# Patient Record
Sex: MALE | Race: WHITE | Employment: FULL TIME | RURAL
[De-identification: names, ages, dates, MRNs, and addresses within clinical notes are randomized per-mention and may not be internally consistent; named-entity substitution may affect disease eponyms.]

---

## 2018-10-13 ENCOUNTER — HOSPITAL ENCOUNTER (EMERGENCY)
Facility: HOSPITAL | Age: 33
Discharge: HOME | End: 2018-10-13
Attending: EMERGENCY MEDICINE
Payer: COMMERCIAL

## 2018-10-13 ENCOUNTER — APPOINTMENT (OUTPATIENT)
Dept: GENERAL RADIOLOGY | Facility: HOSPITAL | Age: 33
End: 2018-10-13
Payer: COMMERCIAL

## 2018-10-13 VITALS
BODY MASS INDEX: 40.43 KG/M2 | SYSTOLIC BLOOD PRESSURE: 122 MMHG | HEIGHT: 74 IN | HEART RATE: 72 BPM | OXYGEN SATURATION: 98 % | WEIGHT: 315 LBS | RESPIRATION RATE: 18 BRPM | TEMPERATURE: 97.3 F | DIASTOLIC BLOOD PRESSURE: 74 MMHG

## 2018-10-13 DIAGNOSIS — S86.912A KNEE STRAIN, LEFT, INITIAL ENCOUNTER: Primary | ICD-10-CM

## 2018-10-13 PROCEDURE — 6370000000 HC RX 637 (ALT 250 FOR IP): Performed by: EMERGENCY MEDICINE

## 2018-10-13 PROCEDURE — 73562 X-RAY EXAM OF KNEE 3: CPT

## 2018-10-13 PROCEDURE — 99283 EMERGENCY DEPT VISIT LOW MDM: CPT

## 2018-10-13 RX ORDER — OXYCODONE HYDROCHLORIDE AND ACETAMINOPHEN 5; 325 MG/1; MG/1
2 TABLET ORAL ONCE
Status: COMPLETED | OUTPATIENT
Start: 2018-10-13 | End: 2018-10-13

## 2018-10-13 RX ORDER — OXYCODONE HYDROCHLORIDE AND ACETAMINOPHEN 5; 325 MG/1; MG/1
1 TABLET ORAL EVERY 6 HOURS PRN
Qty: 12 TABLET | Refills: 0 | Status: SHIPPED | OUTPATIENT
Start: 2018-10-13 | End: 2018-10-16

## 2018-10-13 RX ADMIN — OXYCODONE HYDROCHLORIDE AND ACETAMINOPHEN 2 TABLET: 5; 325 TABLET ORAL at 20:12

## 2018-10-13 ASSESSMENT — PAIN DESCRIPTION - LOCATION
LOCATION: KNEE
LOCATION: KNEE

## 2018-10-13 ASSESSMENT — PAIN DESCRIPTION - PAIN TYPE
TYPE: ACUTE PAIN
TYPE: ACUTE PAIN

## 2018-10-13 ASSESSMENT — ENCOUNTER SYMPTOMS
CHEST TIGHTNESS: 0
COUGH: 0
EYE PAIN: 0
WHEEZING: 0
SORE THROAT: 0
EYE DISCHARGE: 0
CONSTIPATION: 0
EYE REDNESS: 0
TROUBLE SWALLOWING: 0
SINUS PRESSURE: 0
RHINORRHEA: 0
VOMITING: 0
BACK PAIN: 0
SHORTNESS OF BREATH: 0
ABDOMINAL PAIN: 0
NAUSEA: 0
DIARRHEA: 0

## 2018-10-13 ASSESSMENT — PAIN DESCRIPTION - FREQUENCY
FREQUENCY: CONTINUOUS
FREQUENCY: CONTINUOUS

## 2018-10-13 ASSESSMENT — PAIN DESCRIPTION - ORIENTATION
ORIENTATION: LEFT
ORIENTATION: LEFT

## 2018-10-13 ASSESSMENT — PAIN SCALES - GENERAL
PAINLEVEL_OUTOF10: 9
PAINLEVEL_OUTOF10: 10

## 2018-10-13 ASSESSMENT — PAIN DESCRIPTION - DESCRIPTORS
DESCRIPTORS: ACHING
DESCRIPTORS: ACHING

## 2018-10-14 ENCOUNTER — APPOINTMENT (OUTPATIENT)
Dept: GENERAL RADIOLOGY | Facility: HOSPITAL | Age: 33
End: 2018-10-14

## 2018-10-14 ENCOUNTER — APPOINTMENT (OUTPATIENT)
Dept: MRI IMAGING | Facility: HOSPITAL | Age: 33
End: 2018-10-14

## 2018-10-14 ENCOUNTER — HOSPITAL ENCOUNTER (EMERGENCY)
Facility: HOSPITAL | Age: 33
Discharge: HOME | End: 2018-10-15
Attending: EMERGENCY MEDICINE | Admitting: EMERGENCY MEDICINE

## 2018-10-14 DIAGNOSIS — S83.512D RUPTURE OF ANTERIOR CRUCIATE LIGAMENT OF LEFT KNEE, SUBSEQUENT ENCOUNTER: ICD-10-CM

## 2018-10-14 DIAGNOSIS — S93.402D SPRAIN OF LEFT ANKLE, UNSPECIFIED LIGAMENT, SUBSEQUENT ENCOUNTER: ICD-10-CM
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
## 2018-10-14 DIAGNOSIS — M25.562 ACUTE PAIN OF LEFT KNEE: ICD-10-CM

## 2018-10-14 PROCEDURE — 73610 X-RAY EXAM OF ANKLE: CPT

## 2018-10-14 PROCEDURE — 99285 EMERGENCY DEPT VISIT HI MDM: CPT

## 2018-10-14 PROCEDURE — 73721 MRI JNT OF LWR EXTRE W/O DYE: CPT

## 2018-10-14 RX ORDER — OXYCODONE AND ACETAMINOPHEN 10; 325 MG/1; MG/1
1 TABLET ORAL ONCE
Status: COMPLETED | OUTPATIENT
Start: 2018-10-14 | End: 2018-10-14

## 2018-10-14 RX ADMIN — OXYCODONE HYDROCHLORIDE AND ACETAMINOPHEN 1 TABLET: 10; 325 TABLET ORAL at 20:57

## 2018-10-14 NOTE — ED PROVIDER NOTES
Subjective   Pérez Corado is a 33 y.o.male who presents to the ED with his wife with c/o left knee pain with onset yesterday that has worsened. He fell off a wagon and has his left leg caught has he fell backwards off the wagon. He experienced significant left knee pain but denies any other injuries or trauma to his head, neck, back, chest or abdomen. He went to [place] ED with negative xrays and d/c home on crutches and percocet with outpatient MRI with his PCP. Today, his left knee pain has significantly worsened and has worsening left leg edema. His knee pain worsens with movement and bearing weight and is not improved with using his crutches, applying ACE wrap or taking Percocet. He denies any numbness, weakness, fevers or any additional acute complaints at this time.       Large superior marie  Diffuse anterior knee tneedr  Pelvis femor no  tib norml  tneder anterior lateral malleous   No laxity   nv intact         History provided by:  Patient  Leg Pain   Location:  Knee  Time since incident:  1 day  Injury: yes    Mechanism of injury: fall    Fall:     Fall occurred: from wan.    Impact surface:  Unable to specify    Point of impact:  Unable to specify    Entrapped after fall: no    Knee location:  L knee  Pain details:     Quality:  Unable to specify    Radiates to:  Does not radiate    Severity:  Moderate    Onset quality:  Gradual    Duration:  1 day    Timing:  Constant    Progression:  Worsening  Chronicity:  New  Dislocation: no    Foreign body present:  No foreign bodies  Tetanus status:  Unknown  Prior injury to area:  No  Relieved by:  Nothing  Worsened by:  Bearing weight, extension, flexion and activity  Ineffective treatments:  Immobilization and compression (narcotics)  Associated symptoms: decreased ROM and swelling    Associated symptoms: no back pain, no fever, no neck pain and no numbness    Risk factors: obesity        Review of Systems   Constitutional: Negative for chills and fever.    HENT: Negative for congestion and rhinorrhea.    Respiratory: Negative for cough and shortness of breath.    Cardiovascular: Negative for chest pain.   Gastrointestinal: Negative for abdominal pain, diarrhea, nausea and vomiting.   Musculoskeletal: Positive for arthralgias and joint swelling. Negative for back pain and neck pain.   Neurological: Negative for weakness and numbness.   All other systems reviewed and are negative.      History reviewed. No pertinent past medical history.    No Known Allergies    History reviewed. No pertinent surgical history.    History reviewed. No pertinent family history.    Social History     Social History   • Marital status: Single     Social History Main Topics   • Alcohol use Yes   • Drug use: Unknown     Other Topics Concern   • Not on file         Objective   Physical Exam   Constitutional: He is oriented to person, place, and time. He appears well-developed and well-nourished. No distress.   HENT:   Head: Normocephalic and atraumatic.   Right Ear: External ear normal.   Left Ear: External ear normal.   Nose: Nose normal.   Eyes: Conjunctivae are normal. No scleral icterus.   Neck: Normal range of motion. Neck supple.   Cardiovascular: Normal rate, regular rhythm and normal heart sounds.  Exam reveals no friction rub.    No murmur heard.  Pulmonary/Chest: Effort normal and breath sounds normal. No respiratory distress. He has no wheezes. He has no rales.   Abdominal: Soft. Bowel sounds are normal. He exhibits no distension. There is no tenderness.   Musculoskeletal: He exhibits edema and tenderness.    large suprapatellar effusion and diffuse tenderness.  Ankle has mild tenderness anterior and posterior to the lateral malleolus without laxity ecchymosis erythema or warmth and minimal swelling only anterior and inferior to the lateral malleolus. The remainder of the tibia and fibula are unremarkable.  Humerus mid and proximal are nontender.  Pelvis is stable and nontender    Neurological: He is alert and oriented to person, place, and time.   Neurovascularly intact.   Skin: Skin is warm and dry. No rash noted. He is not diaphoretic. No erythema.   Psychiatric: He has a normal mood and affect. His behavior is normal.   Nursing note and vitals reviewed.      Procedures         ED Course  No results found for this or any previous visit (from the past 24 hour(s)).  Note: In addition to lab results from this visit, the labs listed above may include labs taken at another facility or during a different encounter within the last 24 hours. Please correlate lab times with ED admission and discharge times for further clarification of the services performed during this visit.    MRI Knee Left Without Contrast   Final Result   1.  There are no definite intact ACL fibers crossing the joint space. This is    most compatible with ACL rupture.   2. Small areas of contusion in the lateral tibial plateau and lateral femoral    condyle. No discrete fracture lines visualized.   3. Blunting of the free edge of the lateral meniscus, suspicious for small    radial tear.      THIS DOCUMENT HAS BEEN ELECTRONICALLY SIGNED BY TYLER STEWARD MD      XR Ankle 3+ View Left   Final Result     Soft tissue swelling/hematoma without obvious acute bony abnormality or    injury.  Recommend short interval followup if persistent/worsening symptoms.         THIS DOCUMENT HAS BEEN ELECTRONICALLY SIGNED BY JIM RASMUSSEN MD        Vitals:    10/14/18 2200 10/14/18 2230 10/14/18 2300 10/15/18 0100   BP: 155/81 143/95 149/92 137/91   Pulse:  68 71 72   Resp:  16 16 16   Temp:       SpO2: 94% 93% 95% 94%   Weight:       Height:         Medications   oxyCODONE-acetaminophen (PERCOCET)  MG per tablet 1 tablet (1 tablet Oral Given 10/14/18 2057)   oxyCODONE-acetaminophen (PERCOCET)  MG per tablet 1 tablet (1 tablet Oral Given 10/15/18 0227)   ibuprofen (ADVIL,MOTRIN) tablet 600 mg (600 mg Oral Given 10/15/18 0227)     ECG/EMG  Results (last 24 hours)     ** No results found for the last 24 hours. **          ED Course as of Oct 15 0301   Sun Oct 14, 2018   2025 I discussed findings with patient and reevaluated his knee.  He has a large suprapatellar effusion and diffuse tenderness.  Ankle has mild tenderness anterior and posterior to the lateral malleolus without laxity ecchymosis erythema or warmth and minimal swelling only anterior and inferior to the lateral malleolus.  Neurovascularly intact on reevaluation today.  The remainder of the tibia and fibula are unremarkable.  Humerus mid and proximal are nontender.  Pelvis is stable and nontender.I discussed findings with the patient.  He is absolutely in favor of proceeding with MRI in view of his persistent pain.  He reports that when he takes Percocet 10 he just goes to sleep.  I've ordered him a dose.  []   Mon Oct 15, 2018   0253 [place] query complete. Treatment plan to include limited course of prescribed  controlled substance. Risks including addiction, benefits, and alternatives presented to patient.     []   0255 She does serially reevaluated throughout the ED course.  He had a prolonged ED stay as he kept getting bumped from MRI due to more acute patients.  He was quite patient however throughout.  He is treated with analgesics.  MRI shows complete rupture of the ACL.I discussed the immobilizer NSAIDs, analgesics, no weightbearing, and follow-up.  Dr. pereyra is on-call for orthopedics and I've referred the patient to Dr. 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  []      ED Course User Index  [] Holland Olmos MD                     [facility]    Final diagnoses:   Fall, subsequent encounter   Acute pain of left knee   Sprain of left ankle, unspecified ligament,  subsequent encounter   Rupture of anterior cruciate ligament of left knee, subsequent encounter       Documentation assistance provided by mushtaq Ruiz.  Information recorded by the scribe was done at my direction and has been verified and validated by me.     Joe Ruiz  10/14/18 2127       Holland Olmos MD  10/15/18 3616

## 2018-10-14 NOTE — ED PROVIDER NOTES
[address] [place] [address] Court  eMERGENCY dEPARTMENT eNCOUnter      Pt Name: Patricia Becerra  MRN: [id number]  [place] 1985  Date of evaluation: 10/13/2018  Provider: Belkis Lang MD    CHIEF COMPLAINT       Chief Complaint   Patient presents with    Fall     pt fell off wagon and landed on wooden floor, landed on floor, getting left leg stuck in the railing of the wagon approximately 2 hours ago.  c/o left knee pain         HISTORY OF PRESENT ILLNESS   (Location/Symptom, Timing/Onset, Context/Setting, Quality, Duration, Modifying Factors, Severity)  Note limiting factors. Patricia Becerra is a 28 y.o. male who presents to the emergency department s/p fall off wagon hurting left knee 2 hours PTA. No other injury. Nursing Notes were reviewed. REVIEW OF SYSTEMS    (2-9 systems for level 4, 10 or more forlevel 5)     Review of Systems   Constitutional: Negative for chills and fever. HENT: Negative for congestion, ear pain, postnasal drip, rhinorrhea, sinus pressure, sneezing, sore throat and trouble swallowing. Eyes: Negative for pain, discharge and redness. Respiratory: Negative for cough, chest tightness, shortness of breath and wheezing. Cardiovascular: Negative for chest pain, palpitations and leg swelling. Gastrointestinal: Negative for abdominal pain, constipation, diarrhea, nausea and vomiting. Genitourinary: Negative for dysuria, flank pain, frequency, hematuria and urgency. Musculoskeletal: Negative for back pain, joint swelling and neck pain. Left knee pain   Skin: Negative for pallor and rash. Neurological: Negative for dizziness, syncope, weakness, numbness and headaches. Psychiatric/Behavioral: Negative for confusion and hallucinations. The patient is not nervous/anxious. PAST MEDICAL HISTORY     Past Medical History:   Diagnosis Date    Back pain     chronic    ETOH abuse          SURGICAL HISTORY     History reviewed.  No pertinent surgical history. CURRENT MEDICATIONS       Previous Medications    No medications on file       ALLERGIES     Patient has no known allergies. FAMILY HISTORY     History reviewed. No pertinent family history. SOCIAL HISTORY       Social History     Social History    Marital status: Single     Spouse name: N/A    Number of children: N/A    Years of education: N/A     Social History Main Topics    Smoking status: Never Smoker    Smokeless tobacco: Current User    Alcohol use 18.0 oz/week     30 Cans of beer per week    Drug use: No    Sexual activity: Not Asked     Other Topics Concern    None     Social History Narrative    None       SCREENINGS             PHYSICAL EXAM    (up to 7 for level 4, 8 or more for level 5)     ED Triage Vitals [10/13/18 1931]   BP Temp Temp Source Pulse Resp SpO2 Height Weight   124/77 97.3 °F (36.3 °C) Oral 73 18 96 % 6' 2\" (1.88 m) (!) 370 lb (167.8 kg)       Physical Exam   Constitutional: He is oriented to person, place, and time. He appears well-developed and well-nourished. HENT:   Head: Normocephalic and atraumatic. Eyes: Pupils are equal, round, and reactive to light. Conjunctivae and EOM are normal.   Neck: Neck supple. Cardiovascular: Normal rate and regular rhythm. Pulmonary/Chest: Effort normal and breath sounds normal.   Abdominal: Soft. Bowel sounds are normal.   Musculoskeletal: Normal range of motion. Left knee limited ROM sec to pain. NV status intact. No obvious deformity. Neurological: He is alert and oriented to person, place, and time. Skin: Skin is warm and dry. Psychiatric: He has a normal mood and affect.        DIAGNOSTIC RESULTS     EKG: All EKG's are interpreted by the Emergency Department Physician who either signs or Co-signs this chart in the absence of a cardiologist.        RADIOLOGY:   Non-plain film images such as CT, Ultrasound and MRI are read by the radiologist. Plain radiographic images are visualized andpreliminarily interpreted by the emergency physician with the below findings:    Left knee xray negative    Interpretationper the Radiologist below, if available at the time of this note:    XR KNEE LEFT (3 VIEWS)    (Results Pending)         ED BEDSIDE ULTRASOUND:   Performed by ED Physician - none    LABS:  Labs Reviewed - No data to display    All other labs were within normal range or not returned as of this dictation. EMERGENCY DEPARTMENT COURSE and DIFFERENTIAL DIAGNOSIS/MDM:   Vitals:    Vitals:    10/13/18 1931   BP: 124/77   Pulse: 73   Resp: 18   Temp: 97.3 °F (36.3 °C)   TempSrc: Oral   SpO2: 96%   Weight: (!) 370 lb (167.8 kg)   Height: 6' 2\" (1.88 m)           CRITICAL CARE TIME   Total Critical Care time was  minutes, excluding separatelyreportable procedures. There was a high probability ofclinically significant/life threatening deterioration in the patient's condition which required my urgent intervention. CONSULTS:  None    PROCEDURES:  None    PROGRESS NOTES:    Will apply knee immmobilizer, crutches and start on percocet. Follow up with PCP for ortho consult. FINAL IMPRESSION      1. Knee strain, left, initial encounter          Final diagnoses:   Knee strain, left, initial encounter       DISPOSITION/PLAN   DISPOSITION Decision To Discharge 10/13/2018 08:08:36 PM      PATIENT REFERRED TO:  [facility]  [facility] [address] [facility]  [phone]    If symptoms worsen      DISCHARGE MEDICATIONS:  New Prescriptions    OXYCODONE-ACETAMINOPHEN (PERCOCET) 5-325 MG PER TABLET    Take 1 tablet by mouth every 6 hours as needed for Pain for up to 3 days. Intended supply: 3 days. Take lowest dose possible to manage pain.          (Please note thatportions of this note may have been completed with a voice recognition program.  Efforts were [facility] edit the dictations but occasionally words are mis-transcribed.)    Marcelino Gomes MD (electronically

## 2018-10-15 VITALS
RESPIRATION RATE: 16 BRPM | DIASTOLIC BLOOD PRESSURE: 83 MMHG | WEIGHT: 315 LBS | TEMPERATURE: 97.9 F | BODY MASS INDEX: 40.43 KG/M2 | SYSTOLIC BLOOD PRESSURE: 140 MMHG | HEIGHT: 74 IN | HEART RATE: 64 BPM | OXYGEN SATURATION: 94 %

## 2018-10-15 PROBLEM — S83.512A LEFT ANTERIOR CRUCIATE LIGAMENT TEAR: Status: ACTIVE | Noted: 2018-10-15

## 2018-10-15 RX ORDER — IBUPROFEN 600 MG/1
600 TABLET ORAL ONCE
Status: COMPLETED | OUTPATIENT
Start: 2018-10-15 | End: 2018-10-15

## 2018-10-15 RX ORDER — OXYCODONE HYDROCHLORIDE AND ACETAMINOPHEN 5; 325 MG/1; MG/1
1-2 TABLET ORAL EVERY 6 HOURS PRN
Qty: 15 TABLET | Refills: 0 | Status: SHIPPED | OUTPATIENT
Start: 2018-10-15 | End: 2021-02-22

## 2018-10-15 RX ORDER — OXYCODONE AND ACETAMINOPHEN 10; 325 MG/1; MG/1
1 TABLET ORAL ONCE
Status: COMPLETED | OUTPATIENT
Start: 2018-10-15 | End: 2018-10-15

## 2018-10-15 RX ADMIN — IBUPROFEN 600 MG: 600 TABLET ORAL at 02:27

## 2018-10-15 RX ADMIN — OXYCODONE HYDROCHLORIDE AND ACETAMINOPHEN 1 TABLET: 10; 325 TABLET ORAL at 02:27

## 2018-10-15 NOTE — DISCHARGE INSTRUCTIONS
Rest and compression with no weightbearing on the left leg.  Crutches at all times while you're up.    Use the knee immobilizer as this will help with immobility and discomfort    Continue your pain medications as needed with ibuprofen as your primary pain medication in the Percocet as needed for additional pain    Follow-up with Dr Mcintosh, orthopedics for prompt reevaluation and definitive orthopedic care.  Call the office this morning to make a prompt appointment for care    Return to the ED if you have any acute, urgent, emergent, or progressive symptoms as discussed

## 2018-10-30 ENCOUNTER — HOSPITAL ENCOUNTER (OUTPATIENT)
Dept: PHYSICAL THERAPY | Facility: HOSPITAL | Age: 33
Setting detail: THERAPIES SERIES
Discharge: HOME | End: 2018-10-30
Payer: COMMERCIAL

## 2018-10-30 PROCEDURE — 97161 PT EVAL LOW COMPLEX 20 MIN: CPT

## 2018-10-30 ASSESSMENT — PAIN SCALES - GENERAL: PAINLEVEL_OUTOF10: 5

## 2018-10-30 ASSESSMENT — PAIN DESCRIPTION - PAIN TYPE: TYPE: ACUTE PAIN

## 2018-10-30 ASSESSMENT — PAIN DESCRIPTION - LOCATION: LOCATION: KNEE

## 2018-10-30 ASSESSMENT — PAIN DESCRIPTION - ORIENTATION: ORIENTATION: LEFT

## 2018-10-30 NOTE — PROGRESS NOTES
ambulate short community distances without need for an AD. [facility], PT, OCS        Certification of Medical Necessity: It will be understood that this treatment plan is certified medically necessary by the documenting therapist and referring physician mentioned in this report. Unless the physician indicated otherwise through written correspondence with our office, all further referrals will act as certification of medical necessity on this treatment plan. Thank you for this referral.  If you have questions regarding this plan of care, please call [phone].           Revisions to this plan (optional):                     Please sign and return this plan to:   FAX: [phone]      Signature:                                 Date:

## 2018-11-06 ENCOUNTER — HOSPITAL ENCOUNTER (OUTPATIENT)
Dept: PHYSICAL THERAPY | Facility: HOSPITAL | Age: 33
Setting detail: THERAPIES SERIES
Discharge: HOME | End: 2018-11-06
Payer: COMMERCIAL

## 2018-11-06 PROCEDURE — 97110 THERAPEUTIC EXERCISES: CPT

## 2018-11-06 PROCEDURE — 97016 VASOPNEUMATIC DEVICE THERAPY: CPT

## 2018-11-06 NOTE — FLOWSHEET NOTE
Physical Therapy Daily Treatment Note   Date:  2018    TIme In:     5666                 Time Out: 36    Patient Name:  Jesse Brown    :  1985  MRN: [id number]    Restrictions/Precautions:    Pertinent Medical History:  Medical/Treatment Diagnosis Information:  ·   L knee ACL tear  ·    Insurance/Certification information:    Mikayla Long   Physician Information:    Campbell Reis MD  Plan of care signed (Y/N):    Visit# / total visits:     2/    G-Code (if applicable):      Date / Visit # G-Code Applied:         Progress Note: []  Yes  [x]  No  Next due by: Visit #10      Pain level:  4 /10  Subjective: Pt reports his knee is feeling better today and he didn't need his crutches to walk today. He expressed that he went back to the doc yesterday and he seemed to be pleased with his progress so far. Objective:  Observation:   Test measurements:    Palpation:    Exercises:  Exercise Resistance/Repetitions Other comments   QS 2x10 6   Heelslides 2x10 6   SAQ 2x10 6   Hip abd/add 2x10 blue 6   Ankle DF/PF 2x10 blue 6   Nustep (pain free) L4x8' 6                                   Other Therapeutic Activities:      Manual Treatments:      Modalities:  Game ready x 15'. Timed Code Treatment Minutes:  35      Total Treatment Minutes:  38    Treatment/Activity Tolerance:  [x] Patient tolerated treatment well [] Patient limited by fatigue  [] Patient limited by pain  [] Patient limited by other medical complications  [x] Other: Pt completed tx with mod c/o pain and improvement since last visit.     Pain after treatment:      4/10    Prognosis: [x] Good [] Fair  [] Poor    Patient Requires Follow-up: [x] Yes  [] No    Plan:   [x] Continue per plan of care [] Alter current plan (see comments)  [] Plan of care initiated [] Hold pending MD visit [] Discharge    Plan for Next Session:        Electronically signed by:  Gordy Wallace PTA

## 2018-11-08 ENCOUNTER — HOSPITAL ENCOUNTER (OUTPATIENT)
Dept: PHYSICAL THERAPY | Facility: HOSPITAL | Age: 33
Setting detail: THERAPIES SERIES
Discharge: HOME | End: 2018-11-08
Payer: COMMERCIAL

## 2018-11-08 PROCEDURE — 97110 THERAPEUTIC EXERCISES: CPT

## 2018-11-08 PROCEDURE — 97530 THERAPEUTIC ACTIVITIES: CPT

## 2018-11-08 PROCEDURE — 97016 VASOPNEUMATIC DEVICE THERAPY: CPT

## 2018-11-13 ENCOUNTER — HOSPITAL ENCOUNTER (OUTPATIENT)
Dept: PHYSICAL THERAPY | Facility: HOSPITAL | Age: 33
Setting detail: THERAPIES SERIES
End: 2018-11-13
Payer: COMMERCIAL

## 2019-05-04 ENCOUNTER — HOSPITAL ENCOUNTER (OUTPATIENT)
Facility: HOSPITAL | Age: 34
Discharge: HOME | End: 2019-05-04
Payer: COMMERCIAL

## 2021-02-22 ENCOUNTER — OFFICE VISIT (OUTPATIENT)
Dept: NEUROLOGY | Facility: CLINIC | Age: 36
End: 2021-02-22

## 2021-02-22 VITALS
HEIGHT: 74 IN | HEART RATE: 79 BPM | BODY MASS INDEX: 40.43 KG/M2 | DIASTOLIC BLOOD PRESSURE: 80 MMHG | SYSTOLIC BLOOD PRESSURE: 122 MMHG | OXYGEN SATURATION: 94 % | TEMPERATURE: 98.4 F | WEIGHT: 315 LBS

## 2021-02-22 DIAGNOSIS — R20.0 NUMBNESS AND TINGLING OF BOTH FEET: Primary | ICD-10-CM

## 2021-02-22 DIAGNOSIS — I10 HYPERTENSION, UNSPECIFIED TYPE: ICD-10-CM

## 2021-02-22 DIAGNOSIS — G47.19 EXCESSIVE DAYTIME SLEEPINESS: ICD-10-CM

## 2021-02-22 DIAGNOSIS — M79.604 PAIN IN BOTH LOWER EXTREMITIES: ICD-10-CM

## 2021-02-22 DIAGNOSIS — M51.36 DDD (DEGENERATIVE DISC DISEASE), LUMBAR: ICD-10-CM

## 2021-02-22 DIAGNOSIS — M79.605 PAIN IN BOTH LOWER EXTREMITIES: ICD-10-CM

## 2021-02-22 DIAGNOSIS — F10.90 CHRONIC ALCOHOL USE: ICD-10-CM

## 2021-02-22 DIAGNOSIS — R06.83 SNORING: ICD-10-CM

## 2021-02-22 DIAGNOSIS — R20.2 NUMBNESS AND TINGLING OF BOTH FEET: Primary | ICD-10-CM

## 2021-02-22 PROCEDURE — 99204 OFFICE O/P NEW MOD 45 MIN: CPT | Performed by: NURSE PRACTITIONER

## 2021-02-22 RX ORDER — LISINOPRIL 20 MG/1
TABLET ORAL
COMMUNITY
Start: 2020-12-07 | End: 2021-11-04 | Stop reason: ALTCHOICE

## 2021-02-22 RX ORDER — GABAPENTIN 300 MG/1
300 CAPSULE ORAL 2 TIMES DAILY
Qty: 60 CAPSULE | Refills: 1 | Status: SHIPPED | OUTPATIENT
Start: 2021-02-22 | End: 2021-11-04 | Stop reason: ALTCHOICE

## 2021-02-22 NOTE — PATIENT INSTRUCTIONS
Peripheral Neuropathy  Peripheral neuropathy is a type of nerve damage. It affects nerves that carry signals between the spinal cord and the arms, legs, and the rest of the body (peripheral nerves). It does not affect nerves in the spinal cord or brain. In peripheral neuropathy, one nerve or a group of nerves may be damaged. Peripheral neuropathy is a broad category that includes many specific nerve disorders, like diabetic neuropathy, hereditary neuropathy, and carpal tunnel syndrome.  What are the causes?  This condition may be caused by:  · Diabetes. This is the most common cause of peripheral neuropathy.  · Nerve injury.  · Pressure or stress on a nerve that lasts a long time.  · Lack (deficiency) of B vitamins. This can result from alcoholism, poor diet, or a restricted diet.  · Infections.  · Autoimmune diseases, such as rheumatoid arthritis and systemic lupus erythematosus.  · Nerve diseases that are passed from parent to child (inherited).  · Some medicines, such as cancer medicines (chemotherapy).  · Poisonous (toxic) substances, such as lead and mercury.  · Too little blood flowing to the legs.  · Kidney disease.  · Thyroid disease.  In some cases, the cause of this condition is not known.  What are the signs or symptoms?  Symptoms of this condition depend on which of your nerves is damaged. Common symptoms include:  · Loss of feeling (numbness) in the feet, hands, or both.  · Tingling in the feet, hands, or both.  · Burning pain.  · Very sensitive skin.  · Weakness.  · Not being able to move a part of the body (paralysis).  · Muscle twitching.  · Clumsiness or poor coordination.  · Loss of balance.  · Not being able to control your bladder.  · Feeling dizzy.  · Sexual problems.  How is this diagnosed?  Diagnosing and finding the cause of peripheral neuropathy can be difficult. Your health care provider will take your medical history and do a physical exam. A neurological exam will also be done. This  involves checking things that are affected by your brain, spinal cord, and nerves (nervous system). For example, your health care provider will check your reflexes, how you move, and what you can feel.  You may have other tests, such as:  · Blood tests.  · Electromyogram (EMG) and nerve conduction tests. These tests check nerve function and how well the nerves are controlling the muscles.  · Imaging tests, such as CT scans or MRI to rule out other causes of your symptoms.  · Removing a small piece of nerve to be examined in a lab (nerve biopsy). This is rare.  · Removing and examining a small amount of the fluid that surrounds the brain and spinal cord (lumbar puncture). This is rare.  How is this treated?  Treatment for this condition may involve:  · Treating the underlying cause of the neuropathy, such as diabetes, kidney disease, or vitamin deficiencies.  · Stopping medicines that can cause neuropathy, such as chemotherapy.  · Medicine to relieve pain. Medicines may include:  ? Prescription or over-the-counter pain medicine.  ? Antiseizure medicine.  ? Antidepressants.  ? Pain-relieving patches that are applied to painful areas of skin.  · Surgery to relieve pressure on a nerve or to destroy a nerve that is causing pain.  · Physical therapy to help improve movement and balance.  · Devices to help you move around (assistive devices).  Follow these instructions at home:  Medicines  · Take over-the-counter and prescription medicines only as told by your health care provider. Do not take any other medicines without first asking your health care provider.  · Do not drive or use heavy machinery while taking prescription pain medicine.  Lifestyle    · Do not use any products that contain nicotine or tobacco, such as cigarettes and e-cigarettes. Smoking keeps blood from reaching damaged nerves. If you need help quitting, ask your health care provider.  · Avoid or limit alcohol. Too much alcohol can cause a vitamin B  deficiency, and vitamin B is needed for healthy nerves.  · Eat a healthy diet. This includes:  ? Eating foods that are high in fiber, such as fresh fruits and vegetables, whole grains, and beans.  ? Limiting foods that are high in fat and processed sugars, such as fried or sweet foods.  General instructions    · If you have diabetes, work closely with your health care provider to keep your blood sugar under control.  · If you have numbness in your feet:  ? Check every day for signs of injury or infection. Watch for redness, warmth, and swelling.  ? Wear padded socks and comfortable shoes. These help protect your feet.  · Develop a good support system. Living with peripheral neuropathy can be stressful. Consider talking with a mental health specialist or joining a support group.  · Use assistive devices and attend physical therapy as told by your health care provider. This may include using a walker or a cane.  · Keep all follow-up visits as told by your health care provider. This is important.  Contact a health care provider if:  · You have new signs or symptoms of peripheral neuropathy.  · You are struggling emotionally from dealing with peripheral neuropathy.  · Your pain is not well-controlled.  Get help right away if:  · You have an injury or infection that is not healing normally.  · You develop new weakness in an arm or leg.  · You fall frequently.  Summary  · Peripheral neuropathy is when the nerves in the arms, or legs are damaged, resulting in numbness, weakness, or pain.  · There are many causes of peripheral neuropathy, including diabetes, pinched nerves, vitamin deficiencies, autoimmune disease, and hereditary conditions.  · Diagnosing and finding the cause of peripheral neuropathy can be difficult. Your health care provider will take your medical history, do a physical exam, and do tests, including blood tests and nerve function tests.  · Treatment involves treating the underlying cause of the  neuropathy and taking medicines to help control pain. Physical therapy and assistive devices may also help.  This information is not intended to replace advice given to you by your health care provider. Make sure you discuss any questions you have with your health care provider.  Document Revised: 11/30/2018 Document Reviewed: 02/26/2018  Elsevier Patient Education © 2020 [email or web address]    Sleep Apnea  Sleep apnea affects breathing during sleep. It causes breathing to stop for a short time or to become shallow. It can also increase the risk of:  · Heart attack.  · Stroke.  · Being very overweight (obese).  · Diabetes.  · Heart failure.  · Irregular heartbeat.  The goal of treatment is to help you breathe normally again.  What are the causes?  There are three kinds of sleep apnea:  · Obstructive sleep apnea. This is caused by a blocked or collapsed airway.  · Central sleep apnea. This happens when the brain does not send the right signals to the muscles that control breathing.  · Mixed sleep apnea. This is a combination of obstructive and central sleep apnea.  The most common cause of this condition is a collapsed or blocked airway. This can happen if:  · Your throat muscles are too relaxed.  · Your tongue and tonsils are too large.  · You are overweight.  · Your airway is too small.  What increases the risk?  · Being overweight.  · Smoking.  · Having a small airway.  · Being older.  · Being male.  · Drinking alcohol.  · Taking medicines to calm yourself (sedatives or tranquilizers).  · Having family members with the condition.  What are the signs or symptoms?  · Trouble staying asleep.  · Being sleepy or tired during the day.  · Getting angry a lot.  · Loud snoring.  · Headaches in the morning.  · Not being able to focus your mind (concentrate).  · Forgetting things.  · Less interest in sex.  · Mood swings.  · Personality changes.  · Feelings of sadness (depression).  · Waking up a lot during the night to pee  (urinate).  · Dry mouth.  · Sore throat.  How is this diagnosed?  · Your medical history.  · A physical exam.  · A test that is done when you are sleeping (sleep study). The test is most often done in a sleep lab but may also be done at home.  How is this treated?    · Sleeping on your side.  · Using a medicine to get rid of mucus in your nose (decongestant).  · Avoiding the use of alcohol, medicines to help you relax, or certain pain medicines (narcotics).  · Losing weight, if needed.  · Changing your diet.  · Not smoking.  · Using a machine to open your airway while you sleep, such as:  ? An oral appliance. This is a mouthpiece that shifts your lower jaw forward.  ? A CPAP device. This device blows air through a mask when you breathe out (exhale).  ? An EPAP device. This has valves that you put in each nostril.  ? A BPAP device. This device blows air through a mask when you breathe in (inhale) and breathe out.  · Having surgery if other treatments do not work.  It is important to get treatment for sleep apnea. Without treatment, it can lead to:  · High blood pressure.  · Coronary artery disease.  · In men, not being able to have an erection (impotence).  · Reduced thinking ability.  Follow these instructions at home:  Lifestyle  · Make changes that your doctor recommends.  · Eat a healthy diet.  · Lose weight if needed.  · Avoid alcohol, medicines to help you relax, and some pain medicines.  · Do not use any products that contain nicotine or tobacco, such as cigarettes, e-cigarettes, and chewing tobacco. If you need help quitting, ask your doctor.  General instructions  · Take over-the-counter and prescription medicines only as told by your doctor.  · If you were given a machine to use while you sleep, use it only as told by your doctor.  · If you are having surgery, make sure to tell your doctor you have sleep apnea. You may need to bring your device with you.  · Keep all follow-up visits as told by your doctor.  This is important.  Contact a doctor if:  · The machine that you were given to use during sleep bothers you or does not seem to be working.  · You do not get better.  · You get worse.  Get help right away if:  · Your chest hurts.  · You have trouble breathing in enough air.  · You have an uncomfortable feeling in your back, arms, or stomach.  · You have trouble talking.  · One side of your body feels weak.  · A part of your face is hanging down.  These symptoms may be an emergency. Do not wait to see if the symptoms will go away. Get medical help right away. Call your local emergency services (911 in the [place] Do not drive yourself to the hospital.  Summary  · This condition affects breathing during sleep.  · The most common cause is a collapsed or blocked airway.  · The goal of treatment is to help you breathe normally while you sleep.  This information is not intended to replace advice given to you by your health care provider. Make sure you discuss any questions you have with your health care provider.  Document Revised: 10/04/2019 Document Reviewed: 08/13/2019  Elsevier Patient Education © 2020 [employer]vier Inc[employer]

## 2021-02-22 NOTE — PROGRESS NOTES
"     New Patient Office Visit      Patient Name: Pérez Corado  : 1985   MRN: [id number]     Referring Physician: No ref. provider found    Chief Complaint:    Chief Complaint   Patient presents with   • Consult     NP, In office to establish care for severe pain in both feet and legs.        History of Present Illness: Pérez Corado is a 36 y.o. male who is here today to establish care with Neurology.  He is accompanied by his partner today.  He has pain, tightness and tingling in his feet.  He says the symptoms began about 2 months ago.  He says he was tested for diabetes recently and was \"diagnosed as borderline\".  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  He has some burning to his upper legs at times.  He says he just wants his feet to stop hurting so he can work without problems.  He has low back pain and says he has been told he has \"disc problems\"-says his last imaging was 4-5 years ago.  He does have a history of a tractor accident with back injury some years ago.  He denies any history of cancer.  He drinks about 30 beers a week (he has a history of drinking much more but has cut back significantly).  Additional risk factors- BMI 55, patient stated lumbar disc herniation, HTN, chronic alcohol use.     Sleep questionnaire reviewed.  He snores and his partner says he stops breathing while he sleeps.  He sleeps about 3-4 hours per day.  He wakes up feeling tired.  He experiences restless sleep.  He moves his legs frequently during sleep.  He moves his legs frequently during the day also.  [place] sleepiness scale score 6.     Subjective      Review of Systems:   Review of Systems   Constitutional: Negative for fatigue, fever, unexpected weight gain and unexpected weight loss.   HENT: Negative for hearing loss, sore throat, swollen glands, tinnitus and trouble swallowing.    Eyes: Negative for blurred vision, double vision, " photophobia and visual disturbance.   Respiratory: Negative for cough, chest tightness and shortness of breath.    Cardiovascular: Negative for chest pain, palpitations and leg swelling.   Gastrointestinal: Negative for constipation, diarrhea and nausea.   Endocrine: Negative for cold intolerance and heat intolerance.   Musculoskeletal: Positive for back pain. Negative for gait problem, neck pain and neck stiffness.   Skin: Negative for color change and rash.   Allergic/Immunologic: Negative for environmental allergies and food allergies.   Neurological: Positive for numbness. Negative for dizziness, syncope, facial asymmetry, speech difficulty, weakness, headache, memory problem and confusion.   Psychiatric/Behavioral: Negative for agitation, behavioral problems and depressed mood. The patient is not nervous/anxious.        Past Medical History:   Past Medical History:   Diagnosis Date   • Migraine        Past Surgical History:   Past Surgical History:   Procedure Laterality Date   • EYE SURGERY         Family History:   Family History   Problem Relation Age of Onset   • Arthritis Father    • Diabetes Father    • Hypertension Father        Social History:   Social History     Socioeconomic History   • Marital status: Single     Spouse name: Not on file   • Number of children: Not on file   • Years of education: Not on file   • Highest education level: Not on file   Tobacco Use   • Smoking status: Never Smoker   • Smokeless tobacco: Current User   Substance and Sexual Activity   • Alcohol use: Yes     Comment: 30 pack a week    • Drug use: Never   • Sexual activity: Defer       Medications:     Current Outpatient Medications:   •  lisinopril (PRINIVIL,ZESTRIL) 20 MG tablet, , Disp: , Rfl:   •  gabapentin (Neurontin) 300 MG capsule, Take 1 capsule by mouth 2 (two) times a day., Disp: 60 capsule, Rfl: 1    Allergies:   No Known Allergies    Objective     Physical Exam:  Vital Signs:   Vitals:    02/22/21 0837   BP:  "122/80   BP Location: Right arm   Patient Position: Sitting   Cuff Size: Adult   Pulse: 79   Temp: 98.4 °F (36.9 °C)   SpO2: 94%   Weight: (!) 195 kg (429 lb 14.4 oz)   Height: 188 cm (74\")   PainSc: 10-Worst pain ever   PainLoc: Leg  Comment: legs, foot.     Body mass index is 55.2 kg/m².     Physical Exam  Vitals signs and nursing note reviewed.   Constitutional:       General: He is not in acute distress.     Appearance: He is well-developed. He is obese. He is not diaphoretic.   HENT:      Head: Normocephalic and atraumatic.   Eyes:      Conjunctiva/sclera: Conjunctivae normal.      Pupils: Pupils are equal, round, and reactive to light.      Comments: Redness to sclera   Neck:      Musculoskeletal: Normal range of motion and neck supple.   Cardiovascular:      Rate and Rhythm: Normal rate and regular rhythm.      Heart sounds: Normal heart sounds. No murmur. No friction rub. No gallop.    Pulmonary:      Effort: Pulmonary effort is normal. No respiratory distress.      Breath sounds: Normal breath sounds. No wheezing or rales.   Musculoskeletal: Normal range of motion.   Skin:     General: Skin is warm and dry.      Findings: No rash.   Neurological:      Mental Status: He is alert and oriented to person, place, and time.      Coordination: Finger-Nose-Finger Test normal.      Gait: Gait is intact. Tandem walk normal.   Psychiatric:         Speech: Speech normal.         Behavior: Behavior normal.         Thought Content: Thought content normal.         Neurologic Exam     Mental Status   Oriented to person, place, and time.   Attention: normal. Concentration: normal.   Speech: speech is normal   Level of consciousness: alert  Knowledge: good.     Cranial Nerves   Cranial nerves II through XII intact.     CN II   Visual fields full to confrontation.     CN III, IV, VI   Pupils are equal, round, and reactive to light.  Right pupil: Size: 2 mm. Shape: regular. Reactivity: sluggish.   Left pupil: Size: 2 mm. " Shape: regular. Reactivity: sluggish.   CN III: no CN III palsy  CN VI: no CN VI palsy  Nystagmus: none     CN V   Facial sensation intact.     CN VII   Facial expression full, symmetric.     CN VIII   CN VIII normal.     CN IX, X   CN IX normal.   CN X normal.     CN XI   CN XI normal.     CN XII   CN XII normal.     Motor Exam   Muscle bulk: normal  Overall muscle tone: normal    Strength   Right biceps: 5/5  Left biceps: 5/5  Right triceps: 5/5  Left triceps: 5/5  Right quadriceps: 5/5  Left quadriceps: 5/5  Right hamstrin/5  Left hamstrin/5    Sensory Exam   Right arm light touch: normal  Left arm light touch: normal  Right leg light touch: decreased from toes  Left leg light touch: decreased from toes  Proprioception normal.   Right leg pinprick: decreased from toes  Left leg pinprick: decreased from toes    Gait, Coordination, and Reflexes     Gait  Gait: normal    Coordination   Finger to nose coordination: normal  Tandem walking coordination: normal    Tremor   Resting tremor: absent  Intention tremor: absent  Action tremor: absent    Reflexes   Reflexes 2+ except as noted.       Assessment / Plan      Assessment/Plan:   Diagnoses and all orders for this visit:    1. Numbness and tingling of both feet (Primary)  -     Vitamin B12; Future  -     Methylmalonic Acid, Serum; Future; HgbA1c; Iron profile; Lipid profile  -     MRI Lumbar Spine Without Contrast; Future. Advised patient if insurance requires a lumbar spine x-ray, I will order that.   -     EMG & Nerve Conduction Test; Future  -     gabapentin (Neurontin) 300 MG capsule; Take 1 capsule by mouth 2 (two) times a day.  Dispense: 60 capsule; Refill: 1. Phil reviewed and CSA signed. I have advised patient to not take this medication with alcohol.   - Printed patient education on Peripheral neuropathy, Neuropathic pain, and RICCO provided today.   - I have advised patient to slowly cut back on his alcohol intake-I discussed his risk for alcoholic  encephalopathy.   - Advised patient to avoid driving if drowsy.     2. Pain in both lower extremities  -     Vitamin B12; Future  -     Methylmalonic Acid, Serum; Future  -     MRI Lumbar Spine Without Contrast; Future  -     EMG & Nerve Conduction Test; Future  -     gabapentin (Neurontin) 300 MG capsule; Take 1 capsule by mouth 2 (two) times a day.  Dispense: 60 capsule; Refill: 1    3. Snoring  -     Home Sleep Study; Future    4. Excessive daytime sleepiness  -     Home Sleep Study; Future    5. DDD (degenerative disc disease), lumbar  -     MRI Lumbar Spine Without Contrast; Future  -     EMG & Nerve Conduction Test; Future  -     gabapentin (Neurontin) 300 MG capsule; Take 1 capsule by mouth 2 (two) times a day.  Dispense: 60 capsule; Refill: 1    6. BMI 50.0-59.9, adult (CMS/HCC)  -     Home Sleep Study; Future  - Encouraged weight loss with a BMI goal of 24.     7. Hypertension, unspecified type  -     Home Sleep Study; Future- I have discussed the importance of treatment of RICCO in regards to cardiovascular health.        Follow Up:   Return in about 1 month (around 3/22/2021) for Recheck.    FRANCY Cuenca, FNP-C  [facility] Neu[facility]       Please note that portions of this note may have been completed with a voice recognition program. Efforts were made to edit the dictations, but occasionally words are mistranscribed.

## 2021-02-23 DIAGNOSIS — R20.2 NUMBNESS AND TINGLING OF BOTH FEET: ICD-10-CM

## 2021-02-23 DIAGNOSIS — M51.36 DDD (DEGENERATIVE DISC DISEASE), LUMBAR: ICD-10-CM

## 2021-02-23 DIAGNOSIS — M79.604 PAIN IN BOTH LOWER EXTREMITIES: Primary | ICD-10-CM

## 2021-02-23 DIAGNOSIS — M79.605 PAIN IN BOTH LOWER EXTREMITIES: Primary | ICD-10-CM

## 2021-02-23 DIAGNOSIS — R20.0 NUMBNESS AND TINGLING OF BOTH FEET: ICD-10-CM

## 2021-03-08 ENCOUNTER — HOSPITAL ENCOUNTER (OUTPATIENT)
Dept: SLEEP MEDICINE | Facility: HOSPITAL | Age: 36
End: 2021-03-08

## 2021-08-16 ENCOUNTER — APPOINTMENT (OUTPATIENT)
Dept: GENERAL RADIOLOGY | Facility: HOSPITAL | Age: 36
End: 2021-08-16
Payer: MEDICAID

## 2021-08-16 ENCOUNTER — HOSPITAL ENCOUNTER (EMERGENCY)
Facility: HOSPITAL | Age: 36
Discharge: HOME | End: 2021-08-16
Attending: EMERGENCY MEDICINE
Payer: MEDICAID

## 2021-08-16 VITALS
TEMPERATURE: 98 F | WEIGHT: 315 LBS | OXYGEN SATURATION: 96 % | HEART RATE: 75 BPM | RESPIRATION RATE: 18 BRPM | BODY MASS INDEX: 40.43 KG/M2 | DIASTOLIC BLOOD PRESSURE: 99 MMHG | SYSTOLIC BLOOD PRESSURE: 132 MMHG | HEIGHT: 74 IN

## 2021-08-16 DIAGNOSIS — S93.401A SPRAIN OF RIGHT ANKLE, UNSPECIFIED LIGAMENT, INITIAL ENCOUNTER: Primary | ICD-10-CM

## 2021-08-16 PROCEDURE — 6370000000 HC RX 637 (ALT 250 FOR IP): Performed by: EMERGENCY MEDICINE

## 2021-08-16 PROCEDURE — 73610 X-RAY EXAM OF ANKLE: CPT

## 2021-08-16 PROCEDURE — 99282 EMERGENCY DEPT VISIT SF MDM: CPT

## 2021-08-16 RX ORDER — HYDROCODONE BITARTRATE AND ACETAMINOPHEN 5; 325 MG/1; MG/1
2 TABLET ORAL ONCE
Status: COMPLETED | OUTPATIENT
Start: 2021-08-16 | End: 2021-08-16

## 2021-08-16 RX ADMIN — HYDROCODONE BITARTRATE AND ACETAMINOPHEN 2 TABLET: 5; 325 TABLET ORAL at 20:58

## 2021-08-16 ASSESSMENT — PAIN DESCRIPTION - PAIN TYPE: TYPE: ACUTE PAIN

## 2021-08-16 ASSESSMENT — PAIN DESCRIPTION - PROGRESSION: CLINICAL_PROGRESSION: GRADUALLY WORSENING

## 2021-08-16 ASSESSMENT — PAIN SCALES - GENERAL
PAINLEVEL_OUTOF10: 10
PAINLEVEL_OUTOF10: 10

## 2021-08-16 ASSESSMENT — PAIN DESCRIPTION - DESCRIPTORS: DESCRIPTORS: PATIENT UNABLE TO DESCRIBE

## 2021-08-16 ASSESSMENT — PAIN DESCRIPTION - ORIENTATION: ORIENTATION: RIGHT

## 2021-08-16 ASSESSMENT — PAIN DESCRIPTION - LOCATION: LOCATION: FOOT

## 2021-08-16 ASSESSMENT — PAIN DESCRIPTION - ONSET: ONSET: GRADUAL

## 2021-08-16 ASSESSMENT — PAIN DESCRIPTION - FREQUENCY: FREQUENCY: CONTINUOUS

## 2021-08-17 NOTE — ED NOTES
Pt will need taller crutches which are not in the supply room. report to bhavna. She will get his crutches.        Lilian Souza RN  08/16/21 24 [facility] Murrell Najjar, RN  08/16/21 [id number]

## 2021-08-17 NOTE — ED PROVIDER NOTES
[address] [place] [address] Court  eMERGENCY dEPARTMENT eNCOUnter      Pt Name: Jose Aragon  MRN: [id number]  [place]: 1985  Date ofevaluation: 8/01/0522  Provider: Samantha Franco MD    [address] [place] [address]       Chief Complaint   Patient presents with    Foot Pain         HISTORY OF PRESENT ILLNESS  (Location/Symptom, Timing/Onset, Context/Setting, Quality, Duration, Modifying Factors, Severity.)   Jose Aragon is a 39 y.o. male who presents to the emergency department with right ankle pain. Patient denies a specific injury. Patient states that he went fishing yesterday and stood for prolonged hours but denies any known injury. Patient states he just had pain after he took off his boot. Patient is requesting for a work note. Patient states he works at [employer] and has to stand 12 hours a day and he is unable to stand secondary to pain. Nursing notes were reviewed. REVIEW OF SYSTEMS    (2-9systems for level 4, 10 or more for level 5)   ROS:  General:  No fevers, no chills, no weakness  HEENT: No sore throat, runny nose or ear pain  Cardiovascular:  No chest pain, no palpitations  Respiratory:  No shortness of breath, no cough, no wheezing  Gastrointestinal:  No pain, no nausea, no vomiting, no diarrhea  Musculoskeletal: + left ankle pain. Skin:  No rash, no easy bruising  Genitourinary:  No dysuria, no hematuria    Except as noted above theremainder of the review of systems was reviewed and negative. PASTMEDICAL HISTORY     Past Medical History:   Diagnosis Date    Back pain     chronic    ETOH abuse          SURGICAL HISTORY     History reviewed. No pertinent surgical history. CURRENT MEDICATIONS       Previous Medications    No medications on file       ALLERGIES     Patient has no known allergies. FAMILY HISTORY     History reviewed. No pertinent family history.        SOCIAL HISTORY       Social History     Socioeconomic History    Marital status: Single Unable to bear weight; right ankle with tenderness, swelling and slight ecchymosis to medial malleolar area; there is an area of open skin just distal that appears to be cracking from dryness; ? Open source for early infection  Back: No midline or bony tenderness. Dermatology: Skin is warm and dry  Psych: Mentation is grossly normal, cognition is grossly normal. Affect is appropriate. DIAGNOSTIC RESULTS       RADIOLOGY:   Non-plain film images such as CT, Ultrasoundand MRI are read by the radiologist. Plain radiographic images are visualized and preliminarily interpreted by the emergency physician with the below findings:      [] Radiologist's Report Reviewed:  XR ANKLE RIGHT (MIN 3 VIEWS)   Final Result   Impression:   No acute osseous injury. ED BEDSIDE ULTRASOUND:   Performed by ED Physician - none    LABS:  Labs Reviewed - No data to display    I have reviewed and interpreted all of the currently available lab resultsfrom this visit (if applicable):  No results found for this visit on 08/16/21. All other labs were within normal range or not returned as of this dictation. EMERGENCY DEPARTMENT COURSE and DIFFERENTIAL DIAGNOSIS/MDM:   Vitals:    Vitals:    08/16/21 2059 08/16/21 2100 08/16/21 2130   BP:  129/82 115/80   Pulse: 82     Resp: 18     Temp: 98 °F (36.7 °C)     TempSrc: Oral     SpO2: 99% 95% 94%   Weight: (!) 375 lb (170.1 kg)     Height: 6' 2\" (1.88 m)               The patient will follow-up with their PCP in 1-2 days for reevaluation. If the patient or family members have any further concerns or any worsening symptoms they will return to the ED for reevaluation. CONSULTS:  None    PROCEDURES:  Procedures    CRITICAL CARE TIME    Total Critical Care time was 0 minutes, excluding separately reportable procedures.    There was a high probability of clinically significant/life threatening deterioration in the patient's condition which required my urgent

## 2021-08-17 NOTE — ED NOTES
Reviewed discharge plan with Jarred Santiago. Encouraged him to f/u with DAGMAR Peter and he understood. NAD noted on discharge. Ace wrap applied. Pt will be provided with crutches.       Electronically signed by Christian Guidry RN on 8/16/2021 at 10:01 PM       Christian Guidry RN  08/16/21 0937

## 2021-09-14 ENCOUNTER — APPOINTMENT (OUTPATIENT)
Dept: GENERAL RADIOLOGY | Facility: HOSPITAL | Age: 36
End: 2021-09-14

## 2021-09-14 ENCOUNTER — HOSPITAL ENCOUNTER (EMERGENCY)
Facility: HOSPITAL | Age: 36
Discharge: HOME | End: 2021-09-14
Attending: FAMILY MEDICINE | Admitting: FAMILY MEDICINE

## 2021-09-14 ENCOUNTER — APPOINTMENT (OUTPATIENT)
Dept: CT IMAGING | Facility: HOSPITAL | Age: 36
End: 2021-09-14

## 2021-09-14 VITALS
BODY MASS INDEX: 40.43 KG/M2 | SYSTOLIC BLOOD PRESSURE: 154 MMHG | HEART RATE: 91 BPM | DIASTOLIC BLOOD PRESSURE: 89 MMHG | WEIGHT: 315 LBS | TEMPERATURE: 98.7 F | RESPIRATION RATE: 18 BRPM | HEIGHT: 74 IN | OXYGEN SATURATION: 94 %

## 2021-09-14 DIAGNOSIS — R00.2 HEART PALPITATIONS: Primary | ICD-10-CM

## 2021-09-14 LAB
ALBUMIN SERPL-MCNC: 4 G/DL (ref 3.5–5.2)
ALBUMIN/GLOB SERPL: 1.1 G/DL
ALP SERPL-CCNC: 70 U/L (ref 39–117)
ALT SERPL W P-5'-P-CCNC: 64 U/L (ref 1–41)
AMPHET+METHAMPHET UR QL: NEGATIVE
AMPHETAMINES UR QL: NEGATIVE
ANION GAP SERPL CALCULATED.3IONS-SCNC: 13.5 MMOL/L (ref 5–15)
AST SERPL-CCNC: 36 U/L (ref 1–40)
BARBITURATES UR QL SCN: NEGATIVE
BASOPHILS # BLD AUTO: 0.06 10*3/MM3 (ref 0–0.2)
BASOPHILS NFR BLD AUTO: 0.7 % (ref 0–1.5)
BENZODIAZ UR QL SCN: NEGATIVE
BILIRUB SERPL-MCNC: 0.2 MG/DL (ref 0–1.2)
BILIRUB UR QL STRIP: NEGATIVE
BUN SERPL-MCNC: 10 MG/DL (ref 6–20)
BUN/CREAT SERPL: 10.8 (ref 7–25)
BUPRENORPHINE SERPL-MCNC: NEGATIVE NG/ML
CALCIUM SPEC-SCNC: 9.2 MG/DL (ref 8.6–10.5)
CANNABINOIDS SERPL QL: NEGATIVE
CHLORIDE SERPL-SCNC: 99 MMOL/L (ref 98–107)
CLARITY UR: CLEAR
CO2 SERPL-SCNC: 22.5 MMOL/L (ref 22–29)
COCAINE UR QL: NEGATIVE
COLOR UR: YELLOW
CREAT SERPL-MCNC: 0.93 MG/DL (ref 0.76–1.27)
DEPRECATED RDW RBC AUTO: 39.4 FL (ref 37–54)
EOSINOPHIL # BLD AUTO: 0.36 10*3/MM3 (ref 0–0.4)
EOSINOPHIL NFR BLD AUTO: 3.9 % (ref 0.3–6.2)
ERYTHROCYTE [DISTWIDTH] IN BLOOD BY AUTOMATED COUNT: 13.1 % (ref 12.3–15.4)
ETHANOL BLD-MCNC: 47 MG/DL (ref 0–10)
ETHANOL UR QL: 0.05 %
GFR SERPL CREATININE-BSD FRML MDRD: 91 ML/MIN/1.73
GLOBULIN UR ELPH-MCNC: 3.6 GM/DL
GLUCOSE SERPL-MCNC: 116 MG/DL (ref 65–99)
GLUCOSE UR STRIP-MCNC: NEGATIVE MG/DL
HCT VFR BLD AUTO: 47.1 % (ref 37.5–51)
HGB BLD-MCNC: 15.9 G/DL (ref 13–17.7)
HGB UR QL STRIP.AUTO: NEGATIVE
HOLD SPECIMEN: NORMAL
HOLD SPECIMEN: NORMAL
IMM GRANULOCYTES # BLD AUTO: 0.03 10*3/MM3 (ref 0–0.05)
IMM GRANULOCYTES NFR BLD AUTO: 0.3 % (ref 0–0.5)
KETONES UR QL STRIP: NEGATIVE
LEUKOCYTE ESTERASE UR QL STRIP.AUTO: NEGATIVE
LIPASE SERPL-CCNC: 30 U/L (ref 13–60)
LYMPHOCYTES # BLD AUTO: 2.66 10*3/MM3 (ref 0.7–3.1)
LYMPHOCYTES NFR BLD AUTO: 29.1 % (ref 19.6–45.3)
MAGNESIUM SERPL-MCNC: 2.2 MG/DL (ref 1.6–2.6)
MCH RBC QN AUTO: 27.9 PG (ref 26.6–33)
MCHC RBC AUTO-ENTMCNC: 33.8 G/DL (ref 31.5–35.7)
MCV RBC AUTO: 82.6 FL (ref 79–97)
METHADONE UR QL SCN: NEGATIVE
MONOCYTES # BLD AUTO: 0.68 10*3/MM3 (ref 0.1–0.9)
MONOCYTES NFR BLD AUTO: 7.4 % (ref 5–12)
NEUTROPHILS NFR BLD AUTO: 5.35 10*3/MM3 (ref 1.7–7)
NEUTROPHILS NFR BLD AUTO: 58.6 % (ref 42.7–76)
NITRITE UR QL STRIP: NEGATIVE
NRBC BLD AUTO-RTO: 0 /100 WBC (ref 0–0.2)
NT-PROBNP SERPL-MCNC: 14.7 PG/ML (ref 0–450)
OPIATES UR QL: NEGATIVE
OXYCODONE UR QL SCN: NEGATIVE
PCP UR QL SCN: NEGATIVE
PH UR STRIP.AUTO: <=5 [PH] (ref 5–8)
PLATELET # BLD AUTO: 302 10*3/MM3 (ref 140–450)
PMV BLD AUTO: 9.9 FL (ref 6–12)
POTASSIUM SERPL-SCNC: 4.3 MMOL/L (ref 3.5–5.2)
PROPOXYPH UR QL: NEGATIVE
PROT SERPL-MCNC: 7.6 G/DL (ref 6–8.5)
PROT UR QL STRIP: NEGATIVE
RBC # BLD AUTO: 5.7 10*6/MM3 (ref 4.14–5.8)
SARS-COV-2 RNA PNL SPEC NAA+PROBE: NOT DETECTED
SODIUM SERPL-SCNC: 135 MMOL/L (ref 136–145)
SP GR UR STRIP: 1.01 (ref 1–1.03)
TRICYCLICS UR QL SCN: NEGATIVE
TROPONIN T SERPL-MCNC: <0.01 NG/ML (ref 0–0.03)
TROPONIN T SERPL-MCNC: <0.01 NG/ML (ref 0–0.03)
UROBILINOGEN UR QL STRIP: NORMAL
WBC # BLD AUTO: 9.14 10*3/MM3 (ref 3.4–10.8)
WHOLE BLOOD HOLD SPECIMEN: NORMAL
WHOLE BLOOD HOLD SPECIMEN: NORMAL

## 2021-09-14 PROCEDURE — 96376 TX/PRO/DX INJ SAME DRUG ADON: CPT

## 2021-09-14 PROCEDURE — 87635 SARS-COV-2 COVID-19 AMP PRB: CPT | Performed by: FAMILY MEDICINE

## 2021-09-14 PROCEDURE — 85025 COMPLETE CBC W/AUTO DIFF WBC: CPT

## 2021-09-14 PROCEDURE — 83690 ASSAY OF LIPASE: CPT | Performed by: FAMILY MEDICINE

## 2021-09-14 PROCEDURE — 83880 ASSAY OF NATRIURETIC PEPTIDE: CPT | Performed by: FAMILY MEDICINE

## 2021-09-14 PROCEDURE — 96374 THER/PROPH/DIAG INJ IV PUSH: CPT

## 2021-09-14 PROCEDURE — 80053 COMPREHEN METABOLIC PANEL: CPT | Performed by: FAMILY MEDICINE

## 2021-09-14 PROCEDURE — 81003 URINALYSIS AUTO W/O SCOPE: CPT | Performed by: FAMILY MEDICINE

## 2021-09-14 PROCEDURE — 82077 ASSAY SPEC XCP UR&BREATH IA: CPT | Performed by: FAMILY MEDICINE

## 2021-09-14 PROCEDURE — 99283 EMERGENCY DEPT VISIT LOW MDM: CPT

## 2021-09-14 PROCEDURE — 93005 ELECTROCARDIOGRAM TRACING: CPT | Performed by: FAMILY MEDICINE

## 2021-09-14 PROCEDURE — 80306 DRUG TEST PRSMV INSTRMNT: CPT | Performed by: FAMILY MEDICINE

## 2021-09-14 PROCEDURE — 71045 X-RAY EXAM CHEST 1 VIEW: CPT

## 2021-09-14 PROCEDURE — 83735 ASSAY OF MAGNESIUM: CPT | Performed by: FAMILY MEDICINE

## 2021-09-14 PROCEDURE — 84484 ASSAY OF TROPONIN QUANT: CPT | Performed by: FAMILY MEDICINE

## 2021-09-14 RX ORDER — ASPIRIN 81 MG/1
81 TABLET ORAL DAILY
Qty: 30 TABLET | Refills: 0 | Status: SHIPPED | OUTPATIENT
Start: 2021-09-14 | End: 2021-11-04 | Stop reason: ALTCHOICE

## 2021-09-14 RX ORDER — SODIUM CHLORIDE 0.9 % (FLUSH) 0.9 %
10 SYRINGE (ML) INJECTION AS NEEDED
Status: DISCONTINUED | OUTPATIENT
Start: 2021-09-14 | End: 2021-09-14 | Stop reason: HOSPADM

## 2021-09-14 RX ORDER — DILTIAZEM HYDROCHLORIDE 5 MG/ML
5 INJECTION INTRAVENOUS ONCE
Status: COMPLETED | OUTPATIENT
Start: 2021-09-14 | End: 2021-09-14

## 2021-09-14 RX ORDER — DILTIAZEM HYDROCHLORIDE 5 MG/ML
10 INJECTION INTRAVENOUS ONCE
Status: COMPLETED | OUTPATIENT
Start: 2021-09-14 | End: 2021-09-14

## 2021-09-14 RX ORDER — IBUPROFEN 600 MG/1
600 TABLET ORAL EVERY 6 HOURS PRN
COMMUNITY
End: 2021-11-04 | Stop reason: ALTCHOICE

## 2021-09-14 RX ADMIN — DILTIAZEM HYDROCHLORIDE 10 MG: 5 INJECTION, SOLUTION INTRAVENOUS at 03:00

## 2021-09-14 RX ADMIN — DILTIAZEM HYDROCHLORIDE 5 MG: 5 INJECTION, SOLUTION INTRAVENOUS at 01:23

## 2021-09-14 NOTE — ED PROVIDER NOTES
Subjective   36-year-old male with past medical history of morbid obesity, hypertension presents emergency department via [facility] for palpitations..  Patient reports that over the last several weeks patient has had intermittent palpitations says he can feel when his heart gets out of rhythm does not last long but he can still tell that it is there he said he has had shortness of breath sore throat cough congestion and over the last couple days these palpitations have been coming more frequent so his fiancée called the ambulance [facility] to bring him here for evaluation.      History provided by:  Patient  Palpitations  Palpitations quality:  Regular  Onset quality:  Insidious  Timing:  Intermittent  Progression:  Waxing and waning  Chronicity:  New  Relieved by:  Nothing  Worsened by:  Nothing  Ineffective treatments:  None tried  Associated symptoms: nausea and shortness of breath    Associated symptoms: no chest pain        Review of Systems   Constitutional: Negative.  Negative for fever.   HENT: Negative.    Respiratory: Positive for shortness of breath.    Cardiovascular: Positive for palpitations. Negative for chest pain.   Gastrointestinal: Positive for nausea. Negative for abdominal pain.   Endocrine: Negative.    Genitourinary: Negative.  Negative for dysuria.   Skin: Negative.    Neurological: Negative.    Psychiatric/Behavioral: Negative.    All other systems reviewed and are negative.      Past Medical History:   Diagnosis Date   • Hypertension    • Migraine        No Known Allergies    Past Surgical History:   Procedure Laterality Date   • EYE SURGERY         Family History   Problem Relation Age of Onset   • Arthritis Father    • Diabetes Father    • Hypertension Father        Social History     Socioeconomic History   • Marital status: Single     Spouse name: Not on file   • Number of children: Not on file   • Years of education: Not on file   • Highest education level: Not on file   Tobacco  Use   • Smoking status: Never Smoker   • Smokeless tobacco: Current User   Substance and Sexual Activity   • Alcohol use: Yes     Comment: 30 pack a week    • Drug use: Never   • Sexual activity: Defer           Objective   Physical Exam  Vitals and nursing note reviewed.   Constitutional:       Appearance: Normal appearance. He is obese.   HENT:      Head: Normocephalic and atraumatic.      Right Ear: Tympanic membrane normal.      Left Ear: Tympanic membrane normal.      Nose: Nose normal.      Mouth/Throat:      Mouth: Mucous membranes are moist.   Eyes:      Extraocular Movements: Extraocular movements intact.      Pupils: Pupils are equal, round, and reactive to light.   Cardiovascular:      Rate and Rhythm: Regular rhythm. Tachycardia present.      Pulses: Normal pulses.   Pulmonary:      Effort: Pulmonary effort is normal.      Breath sounds: Normal breath sounds.   Abdominal:      General: Abdomen is flat.   Musculoskeletal:         General: Normal range of motion.   Skin:     General: Skin is warm.      Capillary Refill: Capillary refill takes less than 2 seconds.   Neurological:      General: No focal deficit present.      Mental Status: He is alert and oriented to person, place, and time.   Psychiatric:         Mood and Affect: Mood normal.         Behavior: Behavior normal.         Thought Content: Thought content normal.         Judgment: Judgment normal.         Procedures           ED Course  ED Course as of Sep 21 1907   Tue Sep 14, 2021   0054 EKG interpretation time is 0054 sinus tachycardia 102 bpm QRS duration 78 QT 3-4 QTC is 442 no evidence of acute ST elevation.    [MH]   0128 EKG interpretation time is 0057  bpm QRS duration 78 QT is 310 QTC is 396 no evidence of acute ST elevation prior to patient getting any intervention patient went back into a sinus rhythm.    [MH]   0344 CHADS2 Score for Atrial Fibrillation Stroke Risk - MDCalc  Calculated on Sep 14 2021 4:13 AM  1 points ->  Intermediate risk of thromboembolic event. 2.8% risk of event per year if no coumadin. The adjusted stroke rate was the expected stroke rate per 100 person-years derived from the multivariable model assuming that aspirin was not taken.    []   0345 Due to patient being tachycardic cardiac work-up is negative he is morbidly obese he has a history of hypertension I discussed with patient the need for CT PE protocol just to ensure that he does not have an acute blood clot in his lung causing his palpitations patient was initially agreeable however when he gets to CT he is change his mind he realizes that he could have a blood clot and verbalizes understanding but he says he cannot lie flat because he had a tractor accident when he was in high school and he even sleeps sitting up at home.    []      ED Course User Index  [] Georgette Villanueva DO                                           MDM  Number of Diagnoses or Management Options  Heart palpitations: new and requires workup     Amount and/or Complexity of Data Reviewed  Clinical lab tests: ordered and reviewed  Tests in the radiology section of CPT®: ordered and reviewed  Tests in the medicine section of CPT®: ordered and reviewed  Independent visualization of images, tracings, or specimens: yes    Risk of Complications, Morbidity, and/or Mortality  Presenting problems: high  Diagnostic procedures: high  Management options: high        Final diagnoses:   Heart palpitations       ED Disposition  ED Disposition     ED Disposition Condition Comment    Discharge Stable           Santos Churchill MD  [address] [address] [phone]               Medication List      New Prescriptions    aspirin 81 MG EC tablet  Take 1 tablet by mouth Daily.     metoprolol tartrate 25 MG tablet  Commonly known as: LOPRESSOR  Take 0.5 tablets by mouth 2 (Two) Times a Day.           Where to Get Your Medications      These medications were sent  to [id number] - [place], [place] - [address] [facility] [address] [facility] [address]  - [phone]  - [phone] FX  [address] [address]    Phone: [phone]   · aspirin 81 MG EC tablet  · metoprolol tartrate 25 MG tablet          Georgette Villanueva,   09/21/21 8429

## 2021-11-04 ENCOUNTER — OFFICE VISIT (OUTPATIENT)
Dept: CARDIOLOGY | Facility: CLINIC | Age: 36
End: 2021-11-04

## 2021-11-04 VITALS
SYSTOLIC BLOOD PRESSURE: 130 MMHG | DIASTOLIC BLOOD PRESSURE: 72 MMHG | HEART RATE: 92 BPM | BODY MASS INDEX: 40.43 KG/M2 | OXYGEN SATURATION: 99 % | WEIGHT: 315 LBS | RESPIRATION RATE: 20 BRPM | HEIGHT: 74 IN

## 2021-11-04 DIAGNOSIS — I10 PRIMARY HYPERTENSION: ICD-10-CM

## 2021-11-04 DIAGNOSIS — E66.01 MORBID OBESITY WITH BMI OF 50.0-59.9, ADULT (HCC): ICD-10-CM

## 2021-11-04 DIAGNOSIS — R00.2 PALPITATIONS: Primary | ICD-10-CM

## 2021-11-04 PROCEDURE — 99244 OFF/OP CNSLTJ NEW/EST MOD 40: CPT | Performed by: INTERNAL MEDICINE

## 2021-11-04 RX ORDER — METOPROLOL SUCCINATE 50 MG/1
50 TABLET, EXTENDED RELEASE ORAL DAILY
Qty: 90 TABLET | Refills: 3 | Status: SHIPPED | OUTPATIENT
Start: 2021-11-04 | End: 2022-11-15

## 2021-11-04 RX ORDER — IBUPROFEN 800 MG/1
TABLET ORAL
COMMUNITY
Start: 2021-08-20 | End: 2021-11-04 | Stop reason: ALTCHOICE

## 2021-11-04 NOTE — PROGRESS NOTES
"     [facility] Cardiology OP Consult Note    Pérez Corado  [id number]  2021    Referred By: No ref. provider found    Chief Complaint: Palpitations    History of Present Illness:   Mr. Pérez Corado is a 36 y.o. male who presents to the Cardiology Clinic for evaluation of palpitations. The patient has a past medical history significant for hypertension and morbid obesity with a BMI 57 kg/m². His past cardiac history is significant for palpitations. He reports his initial episode of palpitations occurred approximately 3 months ago. He was in his usual state of health, until he acutely developed palpitations described as a \"racing heart rate.\" He denies any associated dizziness or lightheadedness, however does report he felt fatigued and short of breath. The episode at that time resolved spontaneously. In , the patient developed recurrent palpitations again described as a fast heart rate. The episodes persisted, and he presented to the emergency department for evaluation. On review of records, the patient was apparently tachycardic with a heart rate in the 150s upon presentation. Apparently, an ECG was unable to be obtained prior to the patient converting to sinus rhythm. There is some mention of SVT, however it is unclear if the patient was truly in SVT versus atrial fibrillation. The only ECG available in the chart shows sinus tachycardia at 102 bpm. Since the episode in , he has not had any recurrent episodes. He is unable to identify any clear aggravating or triggering factors for his episodes of tachycardia. He denies exertional chest pain or chest discomfort. No other specific complaints today.    Past Cardiac Testin. Last Coronary Angio: None  2. Prior Stress Testing: None  3. Last Echo: None  4. Prior Holter Monitor: None    Review of Systems:   Review of Systems   Constitutional: Negative for activity change, appetite change, chills, diaphoresis, fatigue, fever, unexpected " "weight gain and unexpected weight loss.   Eyes: Negative for blurred vision and double vision.   Respiratory: Negative for cough, chest tightness, shortness of breath and wheezing.    Cardiovascular: Positive for palpitations. Negative for chest pain and leg swelling.   Gastrointestinal: Negative for abdominal pain, anal bleeding, blood in stool and GERD.   Endocrine: Negative for cold intolerance and heat intolerance.   Genitourinary: Negative for hematuria.   Neurological: Negative for dizziness, syncope, weakness and light-headedness.   Hematological: Does not bruise/bleed easily.   Psychiatric/Behavioral: Negative for depressed mood and stress. The patient is not nervous/anxious.        Past Medical History:   Past Medical History:   Diagnosis Date   • Hypertension    • Migraine        Past Surgical History:   Past Surgical History:   Procedure Laterality Date   • EYE SURGERY         Family History:   Family History   Problem Relation Age of Onset   • Arthritis Father    • Diabetes Father    • Hypertension Father        Social History:   Social History     Socioeconomic History   • Marital status: Single   Tobacco Use   • Smoking status: Never Smoker   • Smokeless tobacco: Current User   Substance and Sexual Activity   • Alcohol use: Yes     Comment: 30 pack a week    • Drug use: Never   • Sexual activity: Defer       Medications:     Current Outpatient Medications:   •  metoprolol succinate XL (TOPROL-XL) 50 MG 24 hr tablet, Take 1 tablet by mouth Daily., Disp: 90 tablet, Rfl: 3    Allergies:   No Known Allergies    Physical Exam:  Vital Signs:   Vitals:    11/04/21 1406 11/04/21 1410   BP: 128/72 130/72   BP Location: Right arm Left arm   Patient Position: Sitting Sitting   Pulse: 92    Resp: 20    SpO2: 99%    Weight: (!) 202 kg (445 lb)    Height: 188 cm (74\")        Physical Exam  Constitutional:       General: He is not in acute distress.     Appearance: He is well-developed. He is obese. He is not " diaphoretic.   HENT:      Head: Normocephalic and atraumatic.   Eyes:      General: No scleral icterus.     Pupils: Pupils are equal, round, and reactive to light.   Neck:      Trachea: No tracheal deviation.   Cardiovascular:      Rate and Rhythm: Normal rate and regular rhythm.      Heart sounds: Normal heart sounds. No murmur heard.  No friction rub. No gallop.       Comments: Normal JVD.    Pulmonary:      Effort: Pulmonary effort is normal. No respiratory distress.      Breath sounds: Normal breath sounds. No stridor. No wheezing or rales.   Chest:      Chest wall: No tenderness.   Abdominal:      General: Bowel sounds are normal. There is no distension.      Palpations: Abdomen is soft.      Tenderness: There is no abdominal tenderness. There is no guarding or rebound.      Comments: Obese abdomen   Musculoskeletal:         General: No swelling. Normal range of motion.      Cervical back: Neck supple. No tenderness.   Lymphadenopathy:      Cervical: No cervical adenopathy.   Skin:     General: Skin is warm and dry.      Findings: No erythema.   Neurological:      General: No focal deficit present.      Mental Status: He is alert and oriented to person, place, and time.   Psychiatric:         Mood and Affect: Mood normal.         Behavior: Behavior normal.         Results Review:   I reviewed the patient's new clinical results.    ECG 9/14/2021: Sinus tachycardia at 102 bpm. Normal axis. Occasional PACs.      Assessment / Plan:       1. Palpitations   --Presents today for evaluation of 2 episodes of palpitations related to tachycardia  --On review of ER records, patient was apparently tachycardic at 152 bpm on arrival  --As ECG was unable to be performed prior to the patient converting to sinus rhythm, therefore it is currently unclear if the patient was in SVT versus possible atrial fibrillation with RVR  --ECG available for review shows sinus tachycardia  --No recurrent episodes since 9/21  --Will obtain  baseline echocardiogram to rule out underlying structural valvular abnormalities  --30-day outpatient cardiac monitoring to attempt to further define tachyarrhythmia  --Follow-up in 3 months, sooner if required    2. Primary hypertension  --Continue metoprolol    3. Morbid obesity with BMI of 50.0-59.9  --Complicates all aspects of care        Follow Up:   Return in about 3 months (around 2/4/2022).      Thank you for allowing me to participate in the care of your patient. Please to not hesitate to contact me with additional questions or concerns.     JUAN R Churchill MD  Interventional Cardiology   11/04/2021  14:10 EDT

## 2021-11-09 ENCOUNTER — TELEPHONE (OUTPATIENT)
Dept: CARDIOLOGY | Facility: CLINIC | Age: 36
End: 2021-11-09

## 2021-12-07 ENCOUNTER — TELEPHONE (OUTPATIENT)
Dept: CARDIOLOGY | Facility: CLINIC | Age: 36
End: 2021-12-07

## 2021-12-07 NOTE — TELEPHONE ENCOUNTER
Patient started having chest pain/indigestion issues in the last week. He thinks this may be due to getting use to the medication or because he is not eating with the medication as it suggested. He states it feels like he is having food stuck in his chest/ GI area. Patient states when he took Lisinopril before seeing you that he never had these symptoms. I advised the patient to eat with the medication if see if this helps. He had ECHO today and MCOT placed today also.    Please review and advise.

## 2021-12-10 NOTE — TELEPHONE ENCOUNTER
Dr. Churchill request for the patient to follow up to evaluate his symptoms with Natalya next week.    Appointment made.

## 2021-12-14 ENCOUNTER — OFFICE VISIT (OUTPATIENT)
Dept: CARDIOLOGY | Facility: CLINIC | Age: 36
End: 2021-12-14

## 2021-12-14 VITALS
BODY MASS INDEX: 40.43 KG/M2 | SYSTOLIC BLOOD PRESSURE: 148 MMHG | RESPIRATION RATE: 22 BRPM | OXYGEN SATURATION: 95 % | HEART RATE: 86 BPM | WEIGHT: 315 LBS | DIASTOLIC BLOOD PRESSURE: 82 MMHG | HEIGHT: 74 IN

## 2021-12-14 DIAGNOSIS — R07.89 CHEST DISCOMFORT: Primary | ICD-10-CM

## 2021-12-14 DIAGNOSIS — Z91.89 AT RISK FOR OBSTRUCTIVE SLEEP APNEA: ICD-10-CM

## 2021-12-14 DIAGNOSIS — R06.83 SNORING: ICD-10-CM

## 2021-12-14 PROCEDURE — 99213 OFFICE O/P EST LOW 20 MIN: CPT | Performed by: NURSE PRACTITIONER

## 2021-12-14 NOTE — PROGRESS NOTES
"             [facility] Cardiology Office Follow Up Note    Pérez Corado  [id number]  12/14/2021    Primary Care Provider: Madelyn Kwong APRN   Referring Provider: No ref. provider found    Chief Complaint: Chest pain    History of Present Illness:   Mr. Pérez Corado is a 36 y.o. male who presents to the Cardiology Clinic for evaluation of chest pain.  The patient has a past medical history significant for hypertension and morbid obesity with a BMI 57 kg/m². His past cardiac history is significant for palpitations. He reports his initial episode of palpitations occurred approximately 3 months ago. He was in his usual state of health, until he acutely developed palpitations described as a \"racing heart rate.\" He denies any associated dizziness or lightheadedness, however does report he felt fatigued and short of breath. The episode at that time resolved spontaneously. In 9/21, the patient developed recurrent palpitations again described as a fast heart rate. The episodes persisted, and he presented to the emergency department for evaluation. On review of records, the patient was apparently tachycardic with a heart rate in the 150s upon presentation. Apparently, an ECG was unable to be obtained prior to the patient converting to sinus rhythm. There is some mention of SVT, however it is unclear if the patient was truly in SVT versus atrial fibrillation. The only ECG available in the chart shows sinus tachycardia at 102 bpm.  At his last cardiology clinic visit on 11/4/2021, the patient reported 2 episodes of palpitations related to tachycardia.  An echocardiogram was ordered to serve as a baseline assessment and rule out underlying structural/valvular abnormalities.  A 30-day outpatient cardiac monitor was also ordered to further define tachyarrhythmia.  Most recently however, the patient called last week with complaints of chest pain/indigestion.  At that time he reported to our medical " assistant that he felt like food was getting stuck in his chest/GI tract.  He presents today for follow-up.  Patient states that once he began taking his metoprolol with food, this lower chest/epigastric discomfort dissipated.  He reports that this discomfort occurred most often within a couple hours after taking his medication.  He is uncertain regarding the duration of the symptoms but usually less than an hour, he states.  He denies any radiation of discomfort.  He denies any associated symptoms of diaphoresis, nausea, vomiting.  He denies having experienced any discomfort in the past 5 to 7 days.  He denies ongoing palpitations.  His fiancée accompanies him today and reports that he snores loudly and stops breathing in his sleep.  The patient has never had a sleep evaluation.  He specifically denies any exertional angina or exertional dyspnea.  He specifically denies dizziness and syncope.  He does report some lower extremity edema with prolonged standing.  He is no other complaints or concerns at this time.    Past Cardiac Testin. Last Coronary Angio: None  2. Prior Stress Testing: None  3. Last Echo: 2021   PRELIMINARY RESULTS SHOW: Left ventricular ejection fraction appears to be 61 - 65%.   Left ventricular diastolic function was normal.   Left atrial volume is mildly increased.  4. Prior Holter Monitor: 30-day MCOT placed on ; results pending    Review of Systems:   Review of Systems   Constitutional: Negative for activity change, chills, diaphoresis, fatigue, fever and unexpected weight gain.   Eyes: Negative for blurred vision and visual disturbance.   Respiratory: Negative for apnea, cough, chest tightness, shortness of breath and wheezing.    Cardiovascular: Negative for chest pain, palpitations and leg swelling.   Gastrointestinal: Positive for indigestion. Negative for abdominal distention, blood in stool and GERD.   Endocrine: Negative for cold intolerance and heat intolerance.  "  Genitourinary: Negative for hematuria.   Musculoskeletal: Negative for gait problem, joint swelling and myalgias.   Skin: Negative for color change, pallor and bruise.   Neurological: Negative for dizziness, seizures, syncope, weakness, light-headedness, numbness, headache and confusion.   Hematological: Does not bruise/bleed easily.   Psychiatric/Behavioral: Negative for behavioral problems, sleep disturbance, suicidal ideas and depressed mood.     I have reviewed and confirmed the accuracy of the ROS as documented by the MA/LPN/RN FRANCY Quinteros    I have reviewed and/or updated the patient's past medical, past surgical, family, social history, problem list and allergies as appropriate.     Medications:     Current Outpatient Medications:   •  metoprolol succinate XL (TOPROL-XL) 50 MG 24 hr tablet, Take 1 tablet by mouth Daily., Disp: 90 tablet, Rfl: 3    Physical Exam:  Vital Signs:   Vitals:    12/14/21 1558   BP: 148/82   BP Location: Right arm   Patient Position: Sitting   Cuff Size: Large Adult   Pulse: 86   Resp: 22   SpO2: 95%   Weight: (!) 203 kg (447 lb)   Height: 188 cm (74.02\")     Body mass index is 57.37 kg/m².    Physical Exam  Vitals and nursing note reviewed.   Constitutional:       General: He is not in acute distress.     Appearance: He is well-developed. He is obese.   HENT:      Head: Normocephalic and atraumatic.   Eyes:      General: No scleral icterus.     Extraocular Movements: Extraocular movements intact.   Neck:      Trachea: Trachea normal.   Cardiovascular:      Rate and Rhythm: Normal rate and regular rhythm.      Pulses: Normal pulses.      Heart sounds: Normal heart sounds. No murmur heard.  No friction rub. No gallop.    Pulmonary:      Effort: Pulmonary effort is normal.      Breath sounds: Normal breath sounds.   Abdominal:      Palpations: Abdomen is soft.      Tenderness: There is no abdominal tenderness.   Musculoskeletal:         General: Normal range of motion. "      Cervical back: Neck supple.      Right lower leg: No edema.      Left lower leg: No edema.   Skin:     General: Skin is warm and dry.      Findings: No bruising, lesion or rash.   Neurological:      Mental Status: He is alert and oriented to person, place, and time.      Motor: No weakness.      Gait: Gait normal.   Psychiatric:         Mood and Affect: Mood normal.         Behavior: Behavior normal. Behavior is cooperative.         Thought Content: Thought content does not include suicidal ideation.         Results Review:   I reviewed the patient's new clinical results.      Assessment / Plan:   Diagnoses and all orders for this visit:    1. Chest/epigastric discomfort (Primary)  --Occurred after taking metoprolol on an empty stomach  --Currently without angina or exertional symptoms  --Improved after taking medication with food  --Denies recurrence in the past 5-7 days    2. At risk for obstructive sleep apnea  --Snoring  --Multiple apneic episodes as reported by his live-in fiancé  --BMI 57  --Patient is currently wearing an outpatient monitor for complaint of palpitations  --Refer to Dr. Mcnamara for sleep evaluation      Preventative Cardiology:   Tobacco Cessation: N/A   Advance Care Planning: ACP discussion was declined by the patient. Patient does not have an advance directive, declines further assistance.     Follow Up:   Follow-up with Dr. Churchill as scheduled    Thank you for allowing me to participate in the care of your patient. Please to not hesitate to contact me with additional questions or concerns.     FRANCY Newman

## 2021-12-17 PROBLEM — R07.89 CHEST DISCOMFORT: Status: ACTIVE | Noted: 2021-12-17

## 2022-01-11 ENCOUNTER — TELEPHONE (OUTPATIENT)
Dept: CARDIOLOGY | Facility: CLINIC | Age: 37
End: 2022-01-11

## 2022-01-11 NOTE — TELEPHONE ENCOUNTER
Melany states the monitor keeps going off and they called the company to have it changed to the [employer] monitor, this did not help it is constantly alarming and stating that it is out of the service area.    I looked up the reports to see if or what was received. Last transmission was 12/14/2021.     I have tried to call the patient back to have the patient go to a service area. I have not been successful.

## 2022-02-08 ENCOUNTER — HOSPITAL ENCOUNTER (OUTPATIENT)
Dept: PHYSICAL THERAPY | Facility: HOSPITAL | Age: 37
Setting detail: THERAPIES SERIES
Discharge: HOME | End: 2022-02-08
Payer: COMMERCIAL

## 2022-02-08 PROCEDURE — 97140 MANUAL THERAPY 1/> REGIONS: CPT

## 2022-02-08 PROCEDURE — 97161 PT EVAL LOW COMPLEX 20 MIN: CPT

## 2022-02-08 PROCEDURE — 97110 THERAPEUTIC EXERCISES: CPT

## 2022-02-08 ASSESSMENT — PAIN DESCRIPTION - DESCRIPTORS: DESCRIPTORS: ACHING;SORE;TIGHTNESS

## 2022-02-08 ASSESSMENT — PAIN DESCRIPTION - PAIN TYPE: TYPE: CHRONIC PAIN

## 2022-02-08 ASSESSMENT — PAIN DESCRIPTION - FREQUENCY: FREQUENCY: INTERMITTENT

## 2022-02-08 ASSESSMENT — PAIN DESCRIPTION - ORIENTATION: ORIENTATION: RIGHT

## 2022-02-08 ASSESSMENT — PAIN SCALES - GENERAL: PAINLEVEL_OUTOF10: 1

## 2022-02-08 ASSESSMENT — PAIN DESCRIPTION - LOCATION: LOCATION: ANKLE

## 2022-02-08 NOTE — PROGRESS NOTES
Physical Therapy  Initial Assessment  Date: 2022  Patient Name: Miguel Boo  MRN: [id number]  : 1985     Treatment Diagnosis: Right ankle pain; decreased ROM and strength; s/s consistent with posterior tibialis tendinitis    Restrictions  Restrictions/Precautions  Restrictions/Precautions: General Precautions  Required Braces or Orthoses?: No    Subjective   General  Chart Reviewed: Yes  Patient assessed for rehabilitation services?: Yes  Response To Previous Treatment: Not applicable  Family / Caregiver Present: No  Referring Practitioner: CRISTIAN  Diagnosis: Posterior tibialis tendinitis  Follows Commands: Within Functional Limits  PT Visit Information  PT Insurance Information: [employer]  Subjective  Subjective: Patient reports: he has been experiencing right ankle pain, medial aspect since this past August; denies injury or specific event, but states that one day after work and going fishing, he noticed bruising at the medial ankle area; had x-rays that wree negative for bony injury; treatment has been conservative with wearing an orthopedic boot and meds; he denies sensory changes or neurologic symptoms; his pain is intermittent, almost daily, is worse with increased walking, particularly if not wearing supportive footwear; he works full-time at [employer] type job, is on his feet constantly, with moderate physical activity - is currenlty off work due to his right ankle pain  Pain Screening  Patient Currently in Pain: Yes  Pain Assessment  Pain Assessment: 0-10  Pain Level: 1  Pain Type: Chronic pain  Pain Location: Ankle  Pain Orientation: Right  Pain Descriptors: Aching;Sore;Tightness  Pain Frequency: Intermittent  Vital Signs  Patient Currently in Pain: Yes    Vision/Hearing  Vision  Vision: Within Functional Limits  Hearing  Hearing: Within functional limits    Orientation  Orientation  Overall Orientation Status: Within Normal Limits    Social/Functional History       Objective Observation/Palpation  Posture: Fair  Palpation: (+)  localized tenderness over right ankle, medial aspect, irina-malleolus in region of posterior tibialis tendon  Observation: Gait: toe-out bilaterally; no obvious edema or discoloration; ankle joint intact    AROM RLE (degrees)  R Knee Flexion 0-145: WFL  R Knee Extension 0: WFL  R Ankle Dorsiflexion 0-20: 0-5  R Ankle Plantar Flexion 0-45: 0-40  R Ankle Forefoot Inversion 0-40: 0-15  R Ankle Forefoot Eversion 0-20: 0-10    Strength RLE  R Knee Flexion: 5/5  R Knee Extension: 5/5  R Ankle Dorsiflexion: 5/5  R Ankle Plantar flexion: 4-/5  R Ankle Inversion: 4-/5  R Ankle Eversion: 4/5  Tone RLE  RLE Tone: Normotonic  Tone LLE  LLE Tone: Normotonic  Motor Control  Gross Motor?: WFL  Additional Measures  Special Tests: Right ankle stable ligamentously  Other: LEFS = 63/80  Sensation  Overall Sensation Status: WFL                   Exercises  Exercise 1: Begin Nustep: L6 x 8'  Exercise 2: seated heel-toe raises - 2 x 20  Exercise 3: begin t-band - 4-way ankle: OTB - 3 x 10  Exercise 4: Right ankle AROM: 4-way: 2 x 20  Exercise 5: Achilles stretch with strap -  15\" x 5  Exercise 6: Manual: STM right posterior tibialis, medial ankle x 10'  Exercise 7: Ice prn to finish  Exercise 8: may try localizes IFC e-stim to posterior tibialis  Exercise 9: may try KT taping                      Assessment   Conditions Requiring Skilled Therapeutic Intervention  Body structures, Functions, Activity limitations: Decreased functional mobility ; Decreased high-level IADLs;Decreased strength;Decreased ROM; Increased pain  Assessment: Right ankle pain; decreased ROM and strength; s/s consistent with posterior tibialis tendinitis; will benefit from PT to help resolve pain, improve ROM and strength, and restore normal function.   Treatment Diagnosis: Right ankle pain; decreased ROM and strength; s/s consistent with posterior tibialis tendinitis  Prognosis: Good  Decision Making: Low Complexity  REQUIRES PT FOLLOW UP: Yes  Treatment Initiated : Evaluation, manual tx/STM, therex, HEP, POC discussion  Activity Tolerance  Activity Tolerance: Patient Tolerated treatment well         Plan   Plan  Times per week: 2-3 x week  Plan weeks: 6 weeks  Current Treatment Recommendations: Strengthening,ROM,Neuromuscular Re-education,Home Exercise Program,Safety Education & Training,Modalities,Manual Therapy - Joint Manipulation,Manual Therapy - Soft Tissue Mobilization,ADL/Self-care Training               Goals  Short term goals  Time Frame for Short term goals: 2-3 weeks  Short term goal 1: Patient to be independent with HEP. Short term goal 2: Patient to report a 30%+ decrease in right ankle pain with performance of routine daily activities. Short term goal 3: Achieve a 1//2 grade strength in right ankle MMG's,  Long term goals  Time Frame for Long term goals : 6 weeks  Long term goal 1: Patient to report a 75%+ decrease in right ankle pain with performance of routine daily activities. Long term goal 2: Patient to achieve right ankle DF AROM to 10 degrees or greater. Long term goal 3: Patient to achieve 5/5 right ankle strength in all planes. Long term goal 4: Patient to achieve progress enough to be able to safely return to work without excacerbation of pain, symptoms. Long term goal 5: Achieve a LEFS score of 65/80. Therapy Time   Individual Concurrent Group Co-treatment   Time In           Time Out           Minutes                   Angie Ramos, KEON       Certification of Medical Necessity: It will be understood that this treatment plan is certified medically necessary by the documenting therapist and referring physician mentioned in this report. Unless the physician indicated otherwise through written correspondence with our office, all further referrals will act as certification of medical necessity on this treatment plan.       Thank you for this referral.  If you have questions regarding this plan of care, please call [phone].           Revisions to this plan (optional):                     Please sign and return this plan to:   FAX: [phone]      Signature:                                 Date:

## 2022-02-10 ENCOUNTER — HOSPITAL ENCOUNTER (OUTPATIENT)
Dept: PHYSICAL THERAPY | Facility: HOSPITAL | Age: 37
Setting detail: THERAPIES SERIES
Discharge: HOME | End: 2022-02-10
Payer: COMMERCIAL

## 2022-02-10 PROCEDURE — 97140 MANUAL THERAPY 1/> REGIONS: CPT

## 2022-02-10 PROCEDURE — 97110 THERAPEUTIC EXERCISES: CPT

## 2022-02-10 PROCEDURE — G0283 ELEC STIM OTHER THAN WOUND: HCPCS

## 2022-02-10 NOTE — FLOWSHEET NOTE
Physical Therapy Daily Treatment Note   Date:  2/10/2022    TIme In:  1332                    Time Out:  1440    Patient Name:  Dorothy Howard    :  1985  MRN: [id number]    Restrictions/Precautions:    Pertinent Medical History:  Medical/Treatment Diagnosis Information:  ·   Right ankle pain; decreased ROM and strength; s/s consistent with posterior tibialis tendinitis  ·    Insurance/Certification information:    [facility] Medicaid  Physician Information:     Plan of care signed (Y/N):    Visit# / total visits:     2/    G-Code (if applicable):      Date / Visit # G-Code Applied:         Progress Note: []  Yes  [x]  No  Next due by: Visit #10      Pain level:   0/10  Subjective: Pt reports no new complaints since IE; tolerated initial treatment well. Objective:   Observation:    Test measurements:     Palpation:    Exercises:  Exercise Resistance/Repetitions Other comments   Nustep L6x8' 10   Seated heel-toe raises 2x20 10   R ankle AROM 4-way 2x20 10   4-way t-band R 3x10 GTB 10   Achilles stretch w/ strap 15\"x5 10                                        Other Therapeutic Activities:  May try KT taping prn - not performed today    Manual Treatments: STM right posterior tibialis, medial ankle x 12'     Modalities:  IFC e-stim to posterior tibialis      Timed Code Treatment Minutes:  50      Total Treatment Minutes: 68     Treatment/Activity Tolerance:  [x] Patient tolerated treatment well [] Patient limited by fatigue  [] Patient limited by pain  [] Patient limited by other medical complications  [x] Other: Pt demonstrated trigger point at posterior tibialis muscle belly. He responded well to [facility] and to addition of MH / ES to address pain and muscle tightness. Pt reported having mild tingling in his toes after treatment today.      Pain after treatment:      0/10    Prognosis: [x] Good [] Fair  [] Poor    Patient Requires Follow-up: [x] Yes  [] No    Plan:   [x] Continue per plan of care [] Alter current plan (see comments)  [] Plan of care initiated [] Hold pending MD visit [] Discharge    Plan for Next Session:        Electronically signed by:  Lazarus Hanger, PT

## 2022-02-17 ENCOUNTER — OFFICE VISIT (OUTPATIENT)
Dept: CARDIOLOGY | Facility: CLINIC | Age: 37
End: 2022-02-17

## 2022-02-17 VITALS
SYSTOLIC BLOOD PRESSURE: 148 MMHG | BODY MASS INDEX: 40.43 KG/M2 | RESPIRATION RATE: 18 BRPM | WEIGHT: 315 LBS | DIASTOLIC BLOOD PRESSURE: 98 MMHG | HEART RATE: 87 BPM | HEIGHT: 74 IN | OXYGEN SATURATION: 97 %

## 2022-02-17 DIAGNOSIS — R00.2 PALPITATIONS: Primary | ICD-10-CM

## 2022-02-17 DIAGNOSIS — E66.01 MORBID OBESITY WITH BMI OF 50.0-59.9, ADULT: ICD-10-CM

## 2022-02-17 DIAGNOSIS — I10 PRIMARY HYPERTENSION: ICD-10-CM

## 2022-02-17 PROCEDURE — 99214 OFFICE O/P EST MOD 30 MIN: CPT | Performed by: INTERNAL MEDICINE

## 2022-02-17 NOTE — PROGRESS NOTES
[facility] Cardiology Office Follow Up Note    Pérez Corado  [id number]  2022    Primary Care Provider: Madelyn Kwong APRN    Chief Complaint: Routine follow-up    History of Present Illness:   Mr. Pérez Corado is a 37 y.o. male who presents to the Cardiology Clinic for evaluation of palpitations. The patient has a past medical history significant for hypertension and morbid obesity with a BMI 57 kg/m². His past cardiac history is significant for palpitations with concern for underlying paroxysmal SVT.  The patient has a history of presentation to the emergency department for palpitations.  At that time, there was mention of SVT however an ECG showed sinus tachycardia.  He subsequently went outpatient cardiac monitoring in  with no significant arrhythmias or paroxysmal SVT.  Today, the patient denies any significant episodes of palpitations since his last follow-up.  No presyncopal or syncopal events.  No significant chest pain or exertional chest discomfort.  No other specific complaints today.     Past Cardiac Testin. Last Coronary Angio: None  2. Prior Stress Testing: None  3. Last Echo:  2021   1.  Normal left ventricular size and systolic function, LVEF 60-65%.   2.  Normal LV diastolic filling pattern.   3.  Mild concentric LVH.   4.  Normal right ventricular size and systolic function.   5.  Normal left and right atrial size.   6.  No significant valvular abnormalities.  4. Prior Holter Monitor:  30-day monitor    1.  Normal study with average heart rate 69 bpm, max 120 bpm    Review of Systems:   Review of Systems   Constitutional: Negative for activity change, appetite change, chills, diaphoresis, fatigue, fever, unexpected weight gain and unexpected weight loss.   Eyes: Negative for blurred vision and double vision.   Respiratory: Negative for cough, chest tightness, shortness of breath and wheezing.    Cardiovascular: Negative for chest pain,  "palpitations and leg swelling.   Gastrointestinal: Negative for abdominal pain, anal bleeding, blood in stool and GERD.   Endocrine: Negative for cold intolerance and heat intolerance.   Genitourinary: Negative for hematuria.   Neurological: Negative for dizziness, syncope, weakness and light-headedness.   Hematological: Does not bruise/bleed easily.   Psychiatric/Behavioral: Negative for depressed mood and stress. The patient is not nervous/anxious.        I have reviewed and/or updated the patient's past medical, past surgical, family, social history, problem list and allergies as appropriate.     Medications:     Current Outpatient Medications:   •  metoprolol succinate XL (TOPROL-XL) 50 MG 24 hr tablet, Take 1 tablet by mouth Daily., Disp: 90 tablet, Rfl: 3    Physical Exam:  Vital Signs:   Vitals:    02/17/22 1033   BP: 148/98   BP Location: Right arm   Patient Position: Sitting   Pulse: 87   Resp: 18   SpO2: 97%   Weight: (!) 203 kg (447 lb)   Height: 188 cm (74\")       Physical Exam  Constitutional:       General: He is not in acute distress.     Appearance: He is obese. He is not diaphoretic.   HENT:      Head: Normocephalic and atraumatic.   Cardiovascular:      Rate and Rhythm: Normal rate and regular rhythm.      Heart sounds: No murmur heard.      Pulmonary:      Effort: Pulmonary effort is normal. No respiratory distress.      Breath sounds: Normal breath sounds. No stridor. No wheezing, rhonchi or rales.   Abdominal:      General: Bowel sounds are normal. There is no distension.      Palpations: Abdomen is soft.      Tenderness: There is no abdominal tenderness. There is no guarding or rebound.   Musculoskeletal:         General: No swelling. Normal range of motion.      Cervical back: Neck supple. No tenderness.   Skin:     General: Skin is warm and dry.   Neurological:      General: No focal deficit present.      Mental Status: He is alert and oriented to person, place, and time.   Psychiatric:       "   Mood and Affect: Mood normal.         Behavior: Behavior normal.         Results Review:   I reviewed the patient's new clinical results.      Assessment / Plan:     1. Palpitations   --History of palpitations, of unclear etiology  --Reported concern for paroxysmal SVT, however no episodes of SVT on review of prior ECGs  --Outpatient 30-day monitor without significant arrhythmias or SVT, no symptoms reported  --Echocardiogram showed normal LV systolic function, no underlying structural abnormalities  --Given unremarkable outpatient cardiac monitoring and lack of recurrent palpitations, no indication for further work-up at this time  --Will follow on a as needed basis     2. Primary hypertension  --Suboptimally controlled with systolic BP in 140s  --Discussed up titration of metoprolol, however the patient prefers to follow-up with his PCP for further management     3. Morbid obesity with BMI of 50.0-59.9  --Complicates all aspects of care    Follow Up:   Return if symptoms worsen or fail to improve.      Thank you for allowing me to participate in the care of your patient. Please to not hesitate to contact me with additional questions or concerns.     JUAN R Churchill MD  Interventional Cardiology   02/17/2022  10:37 EST

## 2022-11-15 RX ORDER — METOPROLOL SUCCINATE 50 MG/1
TABLET, EXTENDED RELEASE ORAL
Qty: 90 TABLET | Refills: 0 | Status: SHIPPED | OUTPATIENT
Start: 2022-11-15

## 2023-02-20 RX ORDER — METOPROLOL SUCCINATE 50 MG/1
TABLET, EXTENDED RELEASE ORAL
Qty: 90 TABLET | Refills: 0 | OUTPATIENT
Start: 2023-02-20

## 2024-08-26 ENCOUNTER — HOSPITAL ENCOUNTER (OUTPATIENT)
Facility: HOSPITAL | Age: 39
Discharge: HOME | End: 2024-08-26
Payer: MEDICAID

## 2024-08-26 ENCOUNTER — HOSPITAL ENCOUNTER (OUTPATIENT)
Dept: GENERAL RADIOLOGY | Facility: HOSPITAL | Age: 39
Discharge: HOME | End: 2024-08-26
Payer: MEDICAID

## 2024-08-26 DIAGNOSIS — M25.572 ARTHRALGIA OF LEFT ANKLE OR FOOT: ICD-10-CM

## 2024-08-26 PROCEDURE — 73610 X-RAY EXAM OF ANKLE: CPT

## 2024-12-11 ENCOUNTER — HOSPITAL ENCOUNTER (OUTPATIENT)
Dept: GENERAL RADIOLOGY | Facility: HOSPITAL | Age: 39
Discharge: HOME | End: 2024-12-11
Payer: MEDICAID

## 2024-12-11 ENCOUNTER — HOSPITAL ENCOUNTER (OUTPATIENT)
Facility: HOSPITAL | Age: 39
Discharge: HOME | End: 2024-12-11
Payer: MEDICAID

## 2024-12-11 DIAGNOSIS — M25.571 ARTHRALGIA OF RIGHT ANKLE: ICD-10-CM

## 2024-12-11 PROCEDURE — 73610 X-RAY EXAM OF ANKLE: CPT

## 2025-04-07 ENCOUNTER — HOSPITAL ENCOUNTER (OUTPATIENT)
Facility: HOSPITAL | Age: 40
Discharge: HOME | End: 2025-04-07

## 2025-04-07 LAB — URATE SERPL-MCNC: 4.6 MG/DL (ref 3.7–8.6)

## 2025-04-07 PROCEDURE — 36415 COLL VENOUS BLD VENIPUNCTURE: CPT

## 2025-04-07 PROCEDURE — 84550 ASSAY OF BLOOD/URIC ACID: CPT
